# Patient Record
Sex: FEMALE | Employment: FULL TIME | URBAN - METROPOLITAN AREA
[De-identification: names, ages, dates, MRNs, and addresses within clinical notes are randomized per-mention and may not be internally consistent; named-entity substitution may affect disease eponyms.]

---

## 2017-08-18 LAB — MAMMOGRAPHY, EXTERNAL: NORMAL

## 2018-05-02 ENCOUNTER — OFFICE VISIT (OUTPATIENT)
Dept: FAMILY MEDICINE CLINIC | Age: 53
End: 2018-05-02

## 2018-05-02 VITALS
TEMPERATURE: 98.7 F | BODY MASS INDEX: 25.39 KG/M2 | SYSTOLIC BLOOD PRESSURE: 138 MMHG | HEIGHT: 66 IN | DIASTOLIC BLOOD PRESSURE: 83 MMHG | HEART RATE: 117 BPM | WEIGHT: 158 LBS | RESPIRATION RATE: 16 BRPM | OXYGEN SATURATION: 99 %

## 2018-05-02 DIAGNOSIS — M25.552 HIP PAIN, ACUTE, LEFT: Primary | ICD-10-CM

## 2018-05-02 RX ORDER — PREDNISONE 20 MG/1
TABLET ORAL
Refills: 0 | COMMUNITY
Start: 2018-04-23 | End: 2018-08-22 | Stop reason: ALTCHOICE

## 2018-05-02 RX ORDER — HYDROCODONE BITARTRATE AND ACETAMINOPHEN 5; 325 MG/1; MG/1
TABLET ORAL
Refills: 0 | COMMUNITY
Start: 2018-04-23 | End: 2018-08-22 | Stop reason: ALTCHOICE

## 2018-05-02 NOTE — LETTER
NOTIFICATION RETURN TO WORK / SCHOOL 
 
5/2/2018 3:56 PM 
 
Ms. Nadeem Calderon [facility] [address] To Whom It May Concern: 
 
Nadeem Calderon  was under the care of [facility] on 05/02/2018. If there are questions or concerns please have the patient contact our office [phone].  
 
 
 
Sincerely, 
 
 
Kathy Moreira MD

## 2018-05-02 NOTE — PROGRESS NOTES
Subjective:   History: This patient is a 48 y.o. female with a chief complaint of left leg pain/thigh pain    She reports onset of low back pain initially about 3 weeks ago which self-resolved and then started to have left leg pain /thigh pain in the anterior and lateral distribution about 1.5 weeks ago. She went to [facility], lumbar X-rays taken, given prednisone taper and hydrocodone. She reports she misread the directions on the steroid pack and actually took three predniosone pills daily for 3 days instead of the taper. She reports while she was on the steroids, the pain was relieved. She is taking 600mg ibuprofen daily without relief of symptoms. She has not had this issue prior. She denies any trauma or falls or acute injury. She exercises at the gym using a recumbent bike and elliptical.   No bowel or bladder incontinence. No fever, night sweats, or weight changes. No saddle anesthesia. Review of Systems:  General/Constitutional:  No fever, chills, sweats, fatigue, night sweats, weakness, weight loss or weight gain   Head: No headache, no trauma   Neck: No swelling, masses, stiffness, pain, or limited movement   Cardiac: No chest pain   Respiratory: No cough, shortness of breath, or dyspnea on exertion   GI: No incontinence. No nausea/vomiting, diarrhea, abdominal pain, bloody or dark stools  : No incontinence. No change in urinary habits. Peripheral Vascular: No edema, coldness, numbness, discoloration, pain, or paresthesias   Musculoskeletal: As per HPI  Neurological: No saddle distribution paresthesia. No siatic pain. No loss of consciousness, dizziness, seizures, dysarthria, cognitive changes, problems with balance, or unilateral weakness.     Past Medical History:   Diagnosis Date    Anemia 10/31/2011    Anemia NEC     Dr. Hipolito Mahoney     Family History   Problem Relation Age of Onset    Hypertension Mother     Hypertension Sister     Other Maternal Grandmother      anyeurism     Current Outpatient Prescriptions   Medication Sig Dispense Refill    HYDROcodone-acetaminophen (NORCO) 5-325 mg per tablet TAKE 1 TABLET BY MOUTH EVERY 4 TO 6 HOURS AS NEEDED FOR PAIN  0    predniSONE (DELTASONE) 20 mg tablet TAKE 3TABS DAILY X 3 DAYS, 2TABS DAILY X 3 DAYS, THEN 1 TAB DAILY X 3 DAYS  0    naproxen (NAPROSYN) 500 mg tablet Take 1 Tab by mouth two (2) times daily (with meals). Take with food or milk 30 Tab 0    calcium 500 mg Tab Take  by mouth.  ferrous sulfate (IRON) 325 mg (65 mg iron) tablet Take 650 mg by mouth Daily (before breakfast).  ascorbic acid (VITAMIN C) 250 mg tablet Take  by mouth. Allergies   Allergen Reactions    Lactose Diarrhea     Social History     Social History    Marital status:      Spouse name: N/A    Number of children: N/A    Years of education: N/A     Occupational History    Not on file. Social History Main Topics    Smoking status: Never Smoker    Smokeless tobacco: Never Used    Alcohol use No    Drug use: No    Sexual activity: Not on file     Other Topics Concern    Not on file     Social History Narrative       Objective:     Visit Vitals    /83    Pulse (!) 117    Temp 98.7 °F (37.1 °C)    Resp 16    Ht 5' 6\" (1.676 m)    Wt 158 lb (71.7 kg)    SpO2 99%    BMI 25.5 kg/m2       General: Alert and oriented and in no acute distress. Responds to all questions appropriately. LUNGS: Respirations unlabored. Skin: No obvious rash.   MSK:    Posture: Normal   Deformity: None    ROM:     Flexion: Normal    Extension: Normal     Lateral bending: Normal      Gait: Normal       Palpation:    L1-L5: No tenderness    Sacrum: No tenderness    Coccyx: No tenderness    Left Paraspinal: + tenderness    Right Paraspinal: No tenderness     Strength (0-5/5)    Hip Flexion:   Left: 5/5  Right: 5/5    Hip Extension:  Left: 5/5  Right: 5/5    Hip Abduction:  Left: 5/5  Right: 5/5    Hip Adduction:  Left: 5/5  Right: 5/5    Knee Extension: Left: 5/5  Right: 5/5    Knee Flexion:   Left: 5/5  Right: 5/5    Ankle dorsiflexion:  Left: 5/5  Right: 5/5    Ankle plantarflexion:  Left: 5/5  Right: 5/5    Great toe extension:  Left: 5/5  Right: 5/5     Sensation: Intact, no deficits   +allodynia in the lateral and anterior aspect of her thigh to light touch. Tender all along the GT bursa as well as the IT band, significant tenderness to light touch. No erythema or skin breakdown noted in left thigh. Bayron's test is negative, Pirformis stretch test is negative. Stressing LFCN on the left did not elicit symptoms. nontender piriformis or ischial tuberosity, nontender SI joint. SI joint tests are negative. Special test:    Straight leg: Left: Negative  Right: Negative    Kylahs: Left: Negative  Right: Negative    Piriformis: Left: Negative  Right: Negative    Imaging: Radiographs of the lumbar spine from [facility] personally reviewed and demonstrates mild degenerative changes of L5-S1. No obvious fracture or dislocation. Assessment:   Left hip pain. Uncertain etiology. Pain mostly localized over the lateral hip and thigh. Sx in the distribution of meralgia paraesthetica but unable to exacerbated sx on exam and no significant hx to suggest MP. Remainder of exam WNL strength, ROM, straight leg, etc.       Plan:   1. Home Exercise Program as per handout. She declined PT but will reconsider if   2. Ice 15 minutes, three times a day PRN and after exercise. Can alternate with heat for 15 minutes. 3. Discussed conservative management and possible injection to GT bursa , left side, if patient does not find relief with exercises and naproxen. Medications:    1. Naproxen (Aleve): 220mg 1-2 tablets twice a day PRN. 2. Acetaminophen (Tylenol):  500mg 1-2 tablets every 6 hours as needed for pain. .    RTC: If no better.

## 2018-05-02 NOTE — PROGRESS NOTES
Chief Complaint   Patient presents with    New Patient     49 y/o F. PResents with an Sciatic nerve in left leg X 1 wk.  Pain is consistant

## 2018-05-02 NOTE — MR AVS SNAPSHOT
[address] 
[phone] Patient: Zoila Hilario MRN: [id number] :1965 Visit Information Date & Time Provider Department Dept. Phone Encounter #  
 2018  3:00 PM Joana Bañuelos, [facility] [phone] [id number] Upcoming Health Maintenance Date Due DTaP/Tdap/Td series (1 - Tdap) 1988 PAP AKA CERVICAL CYTOLOGY 1988 FOBT Q 1 YEAR AGE 50-75 2017 Influenza Age 5 to Adult 2018 BREAST CANCER SCRN MAMMOGRAM 2019 Allergies as of 2018  Review Complete On: 10/12/2015 By: Tarsha Westfall MD  
  
 Severity Noted Reaction Type Reactions Lactose  2012    Diarrhea Current Immunizations  Never Reviewed No immunizations on file. Not reviewed this visit Vitals BP Pulse Temp Resp Height(growth percentile) Weight(growth percentile) 138/83 (!) 117 98.7 °F (37.1 °C) 16 5' 6\" (1.676 m) 158 lb (71.7 kg) SpO2 BMI OB Status Smoking Status 99% 25.5 kg/m2 Unknown Never Smoker Vitals History BMI and BSA Data Body Mass Index Body Surface Area 25.5 kg/m 2 1.83 m 2 Preferred Pharmacy Pharmacy Name Phone [id number] [place], [place] - [address] [address] [phone] Your Updated Medication List  
  
   
This list is accurate as of 18  4:05 PM.  Always use your most recent med list.  
  
  
  
  
 calcium 500 mg Tab Take  by mouth. HYDROcodone-acetaminophen 5-325 mg per tablet Commonly known as:  NORCO  
TAKE 1 TABLET BY MOUTH EVERY 4 TO 6 HOURS AS NEEDED FOR PAIN Iron 325 mg (65 mg iron) tablet Generic drug:  ferrous sulfate Take 650 mg by mouth Daily (before breakfast). naproxen 500 mg tablet Commonly known as:  NAPROSYN Take 1 Tab by mouth two (2) times daily (with meals). Take with food or milk predniSONE 20 mg tablet Commonly known as:  DELTASONE  
TAKE 3TABS DAILY X 3 DAYS, 2TABS DAILY X 3 DAYS, THEN 1 TAB DAILY X 3 DAYS  
  
 VITAMIN C 250 mg tablet Generic drug:  ascorbic acid (vitamin C) Take  by mouth. Introducing [facility] SERVICES! Dear Chaz Nguyen: Thank you for requesting a Vox[employer] account. Our records indicate that you already have an active Voxbone account. You can access your account anytime at [email or web address] Did you know that you can access your hospital and ER discharge instructions at any time in Voxbone? You can also review all of your test results from your hospital stay or ER visit. Additional Information If you have questions, please visit the Frequently Asked Questions section of the Voxbone website at [email or web address] Remember, Voxbone is NOT to be used for urgent needs. For medical emergencies, dial 911. Now available from your iPhone and Android! Please provide this summary of care documentation to your next provider. Your primary care clinician is listed as Melchor Samantha. If you have any questions after today's visit, please call [phone].

## 2018-05-23 ENCOUNTER — OFFICE VISIT (OUTPATIENT)
Dept: FAMILY MEDICINE CLINIC | Age: 53
End: 2018-05-23

## 2018-05-23 VITALS
HEART RATE: 74 BPM | TEMPERATURE: 97.4 F | HEIGHT: 66 IN | SYSTOLIC BLOOD PRESSURE: 133 MMHG | BODY MASS INDEX: 26.16 KG/M2 | DIASTOLIC BLOOD PRESSURE: 87 MMHG | WEIGHT: 162.8 LBS | RESPIRATION RATE: 16 BRPM | OXYGEN SATURATION: 100 %

## 2018-05-23 DIAGNOSIS — H81.11 BPPV (BENIGN PAROXYSMAL POSITIONAL VERTIGO), RIGHT: Primary | ICD-10-CM

## 2018-05-23 NOTE — PATIENT INSTRUCTIONS
Benign Paroxysmal Positional Vertigo (BPPV): Care Instructions  Your Care Instructions    Benign paroxysmal positional vertigo, also called BPPV, is an inner ear problem. It causes a spinning or whirling sensation when you move your head. This sensation is called vertigo. The vertigo usually lasts for less than a minute. People often have vertigo spells for a few days or weeks. Then the vertigo goes away. But it may come back again. The vertigo may be mild, or it may be bad enough to cause unsteadiness, nausea, and vomiting. When you move, your inner ear sends messages to the brain. This helps you keep your balance. Vertigo can happen when debris builds up in the inner ear. The buildup can cause the inner ear to send the wrong message to the brain. Your doctor may move you in different positions to help your vertigo get better faster. This is called the Epley maneuver. Your doctor may also prescribe medicines or exercises to help with your symptoms. Follow-up care is a key part of your treatment and safety. Be sure to make and go to all appointments, and call your doctor if you are having problems. It's also a good idea to know your test results and keep a list of the medicines you take. How can you care for yourself at home? · If your doctor suggests that you do Galvin-Daroff exercises:  ¨ Sit on the edge of a bed or sofa. Quickly lie down on the side that causes the worst vertigo. Lie on your side with your ear down. ¨ Stay in this position for at least 30 seconds or until the vertigo goes away. ¨ Sit up. If this causes vertigo, wait for it to stop. ¨ Repeat the procedure on the other side. ¨ Repeat this 10 times. Do these exercises 2 times a day until the vertigo is gone. When should you call for help? Call 911 anytime you think you may need emergency care. For example, call if:  ? · You have symptoms of a stroke.  These may include:  ¨ Sudden numbness, tingling, weakness, or loss of movement in your face, arm, or leg, especially on only one side of your body. ¨ Sudden vision changes. ¨ Sudden trouble speaking. ¨ Sudden confusion or trouble understanding simple statements. ¨ Sudden problems with walking or balance. ¨ A sudden, severe headache that is different from past headaches. ?Call your doctor now or seek immediate medical care if:  ? · You have new or worse nausea and vomiting. ? · You have new symptoms such as hearing loss or roaring in your ears. ? Watch closely for changes in your health, and be sure to contact your doctor if:  ? · You are not getting better as expected. ? · Your vertigo gets worse. Where can you learn more? Go to http://tico-edin.info/. Enter  in the search box to learn more about \"Benign Paroxysmal Positional Vertigo (BPPV): Care Instructions. \"  Current as of: May 12, 2017  Content Version: 11.4  © 6160-1622 [employer]. Care instructions adapted under license by [employer] (which disclaims liability or warranty for this information). If you have questions about a medical condition or this instruction, always ask your healthcare professional. [place] [address] any warranty or liability for your use of this information.

## 2018-05-23 NOTE — PROGRESS NOTES
Chief Complaint   Patient presents with    Dizziness     Follow-up for vertigo per [facility]. Dizziness starting Monday. Patient says she feels dizzy off and on. Still has moments. Pt unsure what sets off the dizziness. 1. Have you been to the ER, urgent care clinic since your last visit? Hospitalized since your last visit? Yes, [facility]    2. Have you seen or consulted any other health care providers outside of the [address] [place] [address] [place] since your last visit? Include any pap smears or colon screening.   No

## 2018-05-23 NOTE — MR AVS SNAPSHOT
[address] 
[phone] Patient: Rajiv Robb MRN: [id number] :1965 Visit Information Date & Time Provider Department Dept. Phone Encounter #  
 2018  3:30 PM Fausto Waldrop MD [address] [place] [address] [phone] [id number] Upcoming Health Maintenance Date Due DTaP/Tdap/Td series (1 - Tdap) 1988 PAP AKA CERVICAL CYTOLOGY 1988 FOBT Q 1 YEAR AGE 50-75 2017 Influenza Age 5 to Adult 2018 BREAST CANCER SCRN MAMMOGRAM 2019 Allergies as of 2018  Review Complete On: 2018 By: Makeda Han Severity Noted Reaction Type Reactions Lactose  2012    Diarrhea Current Immunizations  Never Reviewed No immunizations on file. Not reviewed this visit You Were Diagnosed With   
  
 Codes Comments BPPV (benign paroxysmal positional vertigo), right    -  Primary ICD-10-CM: H81.11 
ICD-9-CM: 386.11 Vitals BP Pulse Temp Resp Height(growth percentile) Weight(growth percentile) 133/87 (BP 1 Location: Right arm, BP Patient Position: Sitting) 74 97.4 °F (36.3 °C) (Oral) 16 5' 6\" (1.676 m) 162 lb 12.8 oz (73.8 kg) LMP SpO2 BMI OB Status Smoking Status (LMP Unknown) 100% 26.28 kg/m2 Unknown Never Smoker Vitals History BMI and BSA Data Body Mass Index Body Surface Area  
 26.28 kg/m 2 1.85 m 2 Preferred Pharmacy Pharmacy Name Phone [id number] [address] [address] [phone] Your Updated Medication List  
  
   
This list is accurate as of 18  4:25 PM.  Always use your most recent med list.  
  
  
  
  
 calcium 500 mg Tab Take  by mouth. HYDROcodone-acetaminophen 5-325 mg per tablet Commonly known as:  NORCO  
TAKE 1 TABLET BY MOUTH EVERY 4 TO 6 HOURS AS NEEDED FOR PAIN Iron 325 mg (65 mg iron) tablet Generic drug:  ferrous sulfate Take 650 mg by mouth Daily (before breakfast). naproxen 500 mg tablet Commonly known as:  NAPROSYN Take 1 Tab by mouth two (2) times daily (with meals). Take with food or milk  
  
 predniSONE 20 mg tablet Commonly known as:  DELTASONE  
TAKE 3TABS DAILY X 3 DAYS, 2TABS DAILY X 3 DAYS, THEN 1 TAB DAILY X 3 DAYS  
  
 VITAMIN C 250 mg tablet Generic drug:  ascorbic acid (vitamin C) Take  by mouth. Patient Instructions Benign Paroxysmal Positional Vertigo (BPPV): Care Instructions Your Care Instructions Benign paroxysmal positional vertigo, also called BPPV, is an inner ear problem. It causes a spinning or whirling sensation when you move your head. This sensation is called vertigo. The vertigo usually lasts for less than a minute. People often have vertigo spells for a few days or weeks. Then the vertigo goes away. But it may come back again. The vertigo may be mild, or it may be bad enough to cause unsteadiness, nausea, and vomiting. When you move, your inner ear sends messages to the brain. This helps you keep your balance. Vertigo can happen when debris builds up in the inner ear. The buildup can cause the inner ear to send the wrong message to the brain. Your doctor may move you in different positions to help your vertigo get better faster. This is called the Epley maneuver. Your doctor may also prescribe medicines or exercises to help with your symptoms. Follow-up care is a key part of your treatment and safety. Be sure to make and go to all appointments, and call your doctor if you are having problems. It's also a good idea to know your test results and keep a list of the medicines you take. How can you care for yourself at home? · If your doctor suggests that you do Galvin-Daroff exercises: ¨ Sit on the edge of a bed or sofa. Quickly lie down on the side that causes the worst vertigo. Lie on your side with your ear down. ¨ Stay in this position for at least 30 seconds or until the vertigo goes away. ¨ Sit up. If this causes vertigo, wait for it to stop. ¨ Repeat the procedure on the other side. ¨ Repeat this 10 times. Do these exercises 2 times a day until the vertigo is gone. When should you call for help? Call 911 anytime you think you may need emergency care. For example, call if: 
? · You have symptoms of a stroke. These may include: 
¨ Sudden numbness, tingling, weakness, or loss of movement in your face, arm, or leg, especially on only one side of your body. ¨ Sudden vision changes. ¨ Sudden trouble speaking. ¨ Sudden confusion or trouble understanding simple statements. ¨ Sudden problems with walking or balance. ¨ A sudden, severe headache that is different from past headaches. ?Call your doctor now or seek immediate medical care if: 
? · You have new or worse nausea and vomiting. ? · You have new symptoms such as hearing loss or roaring in your ears. ? Watch closely for changes in your health, and be sure to contact your doctor if: 
? · You are not getting better as expected. ? · Your vertigo gets worse. Where can you learn more? Go to http://tico-edin.info/. Enter  in the search box to learn more about \"Benign Paroxysmal Positional Vertigo (BPPV): Care Instructions. \" Current as of: May 12, 2017 Content Version: 11.4 © 5022-0781 [employer]. Care instructions adapted under license by [employer] (which disclaims liability or warranty for this information). If you have questions about a medical condition or this instruction, always ask your healthcare professional. [address] any warranty or liability for your use of this information. Introducing [facility] & HEALTH SERVICES! Dear Grady Zapien: Thank you for requesting a [employer] account. Our records indicate that you already have an active [employer] account.   You can access your account anytime at [email or web address] Did you know that you can access your hospital and ER discharge instructions at any time in DiJiPOP? You can also review all of your test results from your hospital stay or ER visit. Additional Information If you have questions, please visit the Frequently Asked Questions section of the DiJiP[employer] website at [email or web address] Remember, DiJiPOP is NOT to be used for urgent needs. For medical emergencies, dial 911. Now available from your iPhone and Android! Please provide this summary of care documentation to your next provider. Your primary care clinician is listed as Maricel Neely. If you have any questions after today's visit, please call [phone].

## 2018-05-23 NOTE — PROGRESS NOTES
Samm Odonnell is a 48 y.o. female who presents   Vertigo  - started 9 days ago. Felt room spinning around of work day. Felt lightheaded, but no LOC. Call 911, evaluated by EMS and brought to [facility].  Worked up for cerebellar stroke. Underwent CT and MRI which was normal.  Discharged as BPPV. - occurred again when at [employer], went away on its own. - no numbness/tingling/weakness over extremities. No facial droop, dysphagia, aphasia. ROS: (positive in bold)  General: wt loss, fever, fatigue or appetite change   Skin: rashes or suspicious skin lesions  HEENT: changes in vision, smell, taste, hearing, earache, tinnitus, hoarseness, dysphagia, congestion, sore throat  Cardiac: chest pain, palpitations, MERCEDES, orthopnea, PND, edema   Pul: SOB, dyspnea, wheezing, cough, hemoptysis  GI: abdominal pain, N&V, diarrhea, constipation, hematemsis, melena,   : hematuria, dysuria, freq, urgency, incontinence   MS: joint pain, swelling, stiffness, myalgia, back pain  Neuro: weakness, parasthesias, headache, syncope, seizure  Psych: anxiety, depression    Past Medical History:  Past Medical History:   Diagnosis Date    Anemia 10/31/2011    Anemia NEC     Dr. Donya Johnson       Past Surgical History:  Past Surgical History:   Procedure Laterality Date    ENDOSCOPY, COLON, DIAGNOSTIC  10/07    HX  SECTION      HX TONSILLECTOMY         Family History:  Family History   Problem Relation Age of Onset    Hypertension Mother     Hypertension Sister     Other Maternal Grandmother      anyeurism       Allergies:   Allergies   Allergen Reactions    Lactose Diarrhea       Social History:  Social History   Substance Use Topics    Smoking status: Never Smoker    Smokeless tobacco: Never Used    Alcohol use No       Current Meds:  Current Outpatient Prescriptions on File Prior to Visit   Medication Sig Dispense Refill    HYDROcodone-acetaminophen (NORCO) 5-325 mg per tablet TAKE 1 TABLET BY MOUTH EVERY 4 TO 6 HOURS AS NEEDED FOR PAIN  0    predniSONE (DELTASONE) 20 mg tablet TAKE 3TABS DAILY X 3 DAYS, 2TABS DAILY X 3 DAYS, THEN 1 TAB DAILY X 3 DAYS  0    naproxen (NAPROSYN) 500 mg tablet Take 1 Tab by mouth two (2) times daily (with meals). Take with food or milk 30 Tab 0    calcium 500 mg Tab Take  by mouth.  ferrous sulfate (IRON) 325 mg (65 mg iron) tablet Take 650 mg by mouth Daily (before breakfast).  ascorbic acid (VITAMIN C) 250 mg tablet Take  by mouth. No current facility-administered medications on file prior to visit. Visit Vitals    /87 (BP 1 Location: Right arm, BP Patient Position: Sitting)    Pulse 74    Temp 97.4 °F (36.3 °C) (Oral)    Resp 16    Ht 5' 6\" (1.676 m)    Wt 162 lb 12.8 oz (73.8 kg)    LMP  (LMP Unknown)    SpO2 100%    BMI 26.28 kg/m2       Gen: Well developed, well nourished female in no acute distress  HEENT: normocephalic/atraumatic; PERRL; TM intact, translucent, and neutral BL;  oropharynx shows no erythema or exudates. Positive Richy Hallpike in R  Neck:   Supple, no lympadenopathy, no thyromegaly  Card:  RRR, no m/r/g  Chest:  CTAB, no w/r/r  Neuro: CN II-XII grossly intact, reflexes 2+ BL      A/P:      ICD-10-CM ICD-9-CM    1. BPPV (benign paroxysmal positional vertigo), right H81.11 386.11 REFERRAL TO PHYSICAL THERAPY      - improvement in symptoms with Epley maneuver. - advised to look up move on Youtube for recurrent symptoms. - referral for vertigo clinic if no improvement or worsening of symptoms.

## 2018-08-21 LAB — MAMMOGRAPHY, EXTERNAL: NORMAL

## 2018-08-22 ENCOUNTER — TELEPHONE (OUTPATIENT)
Dept: FAMILY MEDICINE CLINIC | Age: 53
End: 2018-08-22

## 2018-08-22 ENCOUNTER — OFFICE VISIT (OUTPATIENT)
Dept: FAMILY MEDICINE CLINIC | Age: 53
End: 2018-08-22

## 2018-08-22 VITALS
SYSTOLIC BLOOD PRESSURE: 125 MMHG | DIASTOLIC BLOOD PRESSURE: 89 MMHG | RESPIRATION RATE: 18 BRPM | TEMPERATURE: 98.2 F | HEIGHT: 66 IN | HEART RATE: 77 BPM | BODY MASS INDEX: 25.97 KG/M2 | OXYGEN SATURATION: 99 % | WEIGHT: 161.6 LBS

## 2018-08-22 DIAGNOSIS — Z86.79 H/O AORTIC VALVE DISEASE: ICD-10-CM

## 2018-08-22 DIAGNOSIS — Z82.49 FAMILY HISTORY OF CARDIAC DISORDER: ICD-10-CM

## 2018-08-22 DIAGNOSIS — Z23 ENCOUNTER FOR IMMUNIZATION: ICD-10-CM

## 2018-08-22 DIAGNOSIS — Z23 NEED FOR SHINGLES VACCINE: ICD-10-CM

## 2018-08-22 DIAGNOSIS — Z00.00 WELL WOMAN EXAM (NO GYNECOLOGICAL EXAM): Primary | ICD-10-CM

## 2018-08-22 DIAGNOSIS — Z13.1 SCREENING FOR DIABETES MELLITUS (DM): ICD-10-CM

## 2018-08-22 DIAGNOSIS — Z13.6 SCREENING FOR CARDIOVASCULAR CONDITION: ICD-10-CM

## 2018-08-22 DIAGNOSIS — Z12.11 SCREEN FOR COLON CANCER: ICD-10-CM

## 2018-08-22 DIAGNOSIS — Z11.3 ROUTINE SCREENING FOR STI (SEXUALLY TRANSMITTED INFECTION): ICD-10-CM

## 2018-08-22 RX ORDER — MECLIZINE HYDROCHLORIDE 25 MG/1
TABLET ORAL
Refills: 0 | COMMUNITY
Start: 2018-05-16 | End: 2019-08-23

## 2018-08-22 RX ORDER — BISMUTH SUBSALICYLATE 262 MG
1 TABLET,CHEWABLE ORAL DAILY
COMMUNITY

## 2018-08-22 NOTE — LETTER
To whom it may concern: 
 
I have examined Cheri Braun, 1965, she is a patient under my care. I have found her to be low risk for TB and do not recommend further testing based on our screening. 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 Please contact my office with questions Sincerely, 
 
 
 
 
Daisy Lan MD 
[address] 08/22/18 10:50 AM

## 2018-08-22 NOTE — TELEPHONE ENCOUNTER
----- Message from Morena Mcintosh MD sent at 8/22/2018 10:24 AM EDT -----  Regarding: records req  Request mammo and pap results from Gynecology at [facility]  Dr Ha Plane    Thanks!

## 2018-08-22 NOTE — LETTER
8/22/2018 10:56 AM 
 
Ms. [place] [id number] To whom it may concern: 
 
I have examined Cheri Braun, 1965, she is a patient under my care. I have found her to be low risk for TB and do not recommend further testing based on our screening. 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 Please contact my office with questions Sincerely, 
 
 
Chaim Aj MD

## 2018-08-22 NOTE — MR AVS SNAPSHOT
[address] 
[phone] Patient: Saul Silva MRN: [id number] :1965 Visit Information Date & Time Provider Department Dept. Phone Encounter #  
 2018 10:00 AM Luis Abernathy 34 [id number] Upcoming Health Maintenance Date Due DTaP/Tdap/Td series (1 - Tdap) 1988 PAP AKA CERVICAL CYTOLOGY 1988 FOBT Q 1 YEAR AGE 50-75 2017 Influenza Age 5 to Adult 2018 BREAST CANCER SCRN MAMMOGRAM 2019 Allergies as of 2018  Review Complete On: 2018 By: Taya Garza LPN Severity Noted Reaction Type Reactions Lactose  2012    Diarrhea Current Immunizations  Never Reviewed No immunizations on file. Not reviewed this visit You Were Diagnosed With   
  
 Codes Comments Well woman exam (no gynecological exam)    -  Primary ICD-10-CM: Z00.00 ICD-9-CM: V70.0 [V70.0] Need for shingles vaccine     ICD-10-CM: Q06 ICD-9-CM: V04.89 Screen for colon cancer     ICD-10-CM: Z12.11 ICD-9-CM: V76.51 Encounter for immunization     ICD-10-CM: J67 ICD-9-CM: V03.89 Family history of cardiac disorder     ICD-10-CM: Z82.49 
ICD-9-CM: V17.49 Routine screening for STI (sexually transmitted infection)     ICD-10-CM: Z11.3 ICD-9-CM: V74.5 H/O aortic valve disease     ICD-10-CM: Z86.79 
ICD-9-CM: V12.59 Screening for cardiovascular condition     ICD-10-CM: Z13.6 ICD-9-CM: V81.2 Screening for diabetes mellitus (DM)     ICD-10-CM: Z13.1 ICD-9-CM: V77.1 Vitals BP Pulse Temp Resp Height(growth percentile) Weight(growth percentile) 125/89 (BP 1 Location: Left arm, BP Patient Position: Sitting) 77 98.2 °F (36.8 °C) (Oral) 18 5' 6\" (1.676 m) 161 lb 9.6 oz (73.3 kg) SpO2 BMI OB Status Smoking Status 99% 26.08 kg/m2 Unknown Never Smoker Vitals History BMI and BSA Data Body Mass Index Body Surface Area 26.08 kg/m 2 1.85 m 2 Preferred Pharmacy Pharmacy Name Phone [facility][id number] - [place] - [address] [phone] Your Updated Medication List  
  
   
This list is accurate as of 18 10:51 AM.  Always use your most recent med list.  
  
  
  
  
 meclizine 25 mg tablet Commonly known as:  ANTIVERT  
TAKE 1 TABLET BY MOUTH 3 TIMES A DAY AS NEEDED FOR DIZZINESS  
  
 multivitamin tablet Commonly known as:  ONE A DAY Take 1 Tab by mouth daily. varicella-zoster recombinant (PF) 50 mcg/0.5 mL Susr injection Commonly known as:  SHINGRIX  
0.5 mL by IntraMUSCular route once for 1 dose. Prescriptions Printed Refills  
 varicella-zoster recombinant, PF, (SHINGRIX) 50 mcg/0.5 mL susr injection 1 Si.5 mL by IntraMUSCular route once for 1 dose. Class: Print Route: IntraMUSCular We Performed the Following CHLAMYDIA/GC PCR [72533 CPT(R)] HIV 1/2 AG/AB, 4TH GENERATION,W RFLX CONFIRM P1631018 CPT(R)] HM COLONOSCOPY [HM4 Custom] LIPID PANEL [98625 CPT(R)] METABOLIC PANEL, COMPREHENSIVE [81531 CPT(R)] REFERRAL TO CARDIOLOGY [UBH84 Custom] REFERRAL TO GASTROENTEROLOGY [QWP83 Custom] RPR W/REFLEX TITER AND CONFIRMATION [[id number] Custom] Referral Information Referral ID Referred By Referred To  
  
 [id number] VALERIE, [address]   
   [address] [place] [address] [address]  [address] Visits Status Start Date End Date 1 New Request 18 If your referral has a status of pending review or denied, additional information will be sent to support the outcome of this decision. Referral ID Referred By Referred To  
 [id number] Cat PADILAL MD  
   [address] [place] [address] [address] [place], [address] [place] [address] Phone: [phone] Fax: [phone] Visits Status Start Date End Date 1 New Request 8/22/18 8/22/19 If your referral has a status of pending review or denied, additional information will be sent to support the outcome of this decision. Introducing \[facility] [place] [facility]\"" & HEALTH SERVICES! Dear Sammy Current: Thank you for requesting a [employer] account. Our records indicate that you already have an active [employer] account. You can access your account anytime at [email or web address] Did you know that you can access your hospital and ER discharge instructions at any time in [employer]? You can also review all of your test results from your hospital stay or ER visit. Additional Information If you have questions, please visit the Frequently Asked Questions section of the [employer] website at [email or web address] Remember, [employer] is NOT to be used for urgent needs. For medical emergencies, dial 911. Now available from your iPhone and Android! Please provide this summary of care documentation to your next provider. Your primary care clinician is listed as Gema Ferrer. If you have any questions after today's visit, please call [phone].

## 2018-08-22 NOTE — PROGRESS NOTES
Subjective:   48 y.o. female for Well Woman Check. Her gyne and breast care is done elsewhere by her Ob-Gyne physician. 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 She and her  have 4 children. No etoh, no tobacco, no illicit drugs. She is willing for STD testing, sexually active and monogamous with one male partner--her  previously has had an affair. She is UTD on pap and mammo. Knows she is due for colonoscopy  Recently found out that both of her cousins (1st) have congenital irregularity of aortic valve (suspected genetic component), both have recently required surgery. She was urged by her family members to have an evaluation with cardiology and she would like to pursue this for reassurance. ROS: Feeling generally well. No TIA's or unusual headaches, no dysphagia. No prolonged cough. No dyspnea or chest pain on exertion. No abdominal pain, change in bowel habits, black or bloody stools. No urinary tract symptoms. No new or unusual musculoskeletal symptoms. A 12 point review of systems was negative except as noted here or in the HPI. Objective: The patient appears well, alert, oriented x 3, in no distress. Visit Vitals    /89 (BP 1 Location: Left arm, BP Patient Position: Sitting)    Pulse 77    Temp 98.2 °F (36.8 °C) (Oral)    Resp 18    Ht 5' 6\" (1.676 m)    Wt 161 lb 9.6 oz (73.3 kg)    SpO2 99%    BMI 26.08 kg/m2     ENT normal.  Neck supple. No adenopathy or thyromegaly. EOMI, PEFRLA, wearing glasses, anicteric, no conjunctival injection. Lungs are clear, good air entry, no wheezes, rhonchi or rales. S1 and S2 normal, no murmurs, regular rate and rhythm. Abdomen soft without tenderness, guarding, mass or organomegaly. Extremities show no edema, normal peripheral pulses. Neurological is normal, no focal findings.   Breast and Pelvic exams are deferred. Assessment/Plan:   Well Woman  continue present diet with no restrictions, continue present plan, routine labs ordered, call if any problems    ICD-10-CM ICD-9-CM    1. Well woman exam (no gynecological exam) Z00.00 V70.0     [V70.0]   2. Need for shingles vaccine Z23 V04.89 varicella-zoster recombinant, PF, (SHINGRIX) 50 mcg/0.5 mL susr injection   3. Screen for colon cancer Z12.11 V76.51 REFERRAL TO GASTROENTEROLOGY       COLONOSCOPY   4. Encounter for immunization Z23 V03.89 varicella-zoster recombinant, PF, (SHINGRIX) 50 mcg/0.5 mL susr injection   5. Family history of cardiac disorder Z82.49 V17.49 LIPID PANEL      REFERRAL TO CARDIOLOGY   6. Routine screening for STI (sexually transmitted infection) Z11.3 V74.5 CHLAMYDIA/GC PCR      HIV 1/2 AG/AB, 4TH GENERATION,W RFLX CONFIRM      RPR W/REFLEX TITER AND CONFIRMATION   7. H/O aortic valve disease Z86.79 V12.59    8.  Screening for cardiovascular condition Z13.6 V81.2 LIPID PANEL   9. Screening for diabetes mellitus (DM) J06.9 S38.0 METABOLIC PANEL, COMPREHENSIVE   Desire for STI testing: Labs as ordered  Screening for DM, Lipid disorders, Colon Ca: as per orders  Family hx of cardiac/aortic valve condition, suspected genetic component: referral to cardiology for determination of risk and level of evaluation  Recommendation for vaccinations: shingles and flu shots recommended this year, may be obtained at pharmacy  Continue to follow with Gynecology for mammo and paps, will request these records for completeness of chart    F/u annually or as needed  Orders Placed This Encounter    CHLAMYDIA/GC PCR    LIPID PANEL    METABOLIC PANEL, COMPREHENSIVE    HIV 1/2 AG/AB, 4TH GENERATION,W RFLX CONFIRM    RPR W/REFLEX TITER AND CONFIRMATION    REFERRAL TO GASTROENTEROLOGY    [facility]    meclizine (ANTIVERT) 25 mg tablet    multivitamin (ONE A DAY) tablet    varicella-zoster recombinant, PF, (SHINGRIX) 50 mcg/0.5 mL susr injection

## 2018-08-22 NOTE — PROGRESS NOTES
Chief Complaint   Patient presents with    Complete Physical     1. Have you been to the ER, urgent care clinic since your last visit? Hospitalized since your last visit? Yes, 05/13/2018 [facility] ER for Vertigo. 2. Have you seen or consulted any other health care providers outside of the [address] [place] [address] since your last visit? Include any pap smears or colon screening.  No

## 2018-09-01 LAB
ALBUMIN SERPL-MCNC: 4.6 G/DL (ref 3.5–5.5)
ALBUMIN/GLOB SERPL: 1.6 {RATIO} (ref 1.2–2.2)
ALP SERPL-CCNC: NORMAL IU/L
ALT SERPL-CCNC: 23 IU/L (ref 0–32)
AST SERPL-CCNC: 26 IU/L (ref 0–40)
BILIRUB SERPL-MCNC: 0.6 MG/DL (ref 0–1.2)
BUN SERPL-MCNC: 12 MG/DL (ref 6–24)
BUN/CREAT SERPL: 14 (ref 9–23)
C TRACH RRNA SPEC QL NAA+PROBE: NEGATIVE
CALCIUM SERPL-MCNC: NORMAL MG/DL
CHLORIDE SERPL-SCNC: NORMAL MMOL/L
CHOLEST SERPL-MCNC: 166 MG/DL (ref 100–199)
CO2 SERPL-SCNC: NORMAL MMOL/L
CREAT SERPL-MCNC: 0.87 MG/DL (ref 0.57–1)
GLOBULIN SER CALC-MCNC: 2.9 G/DL (ref 1.5–4.5)
GLUCOSE SERPL-MCNC: 76 MG/DL (ref 65–99)
HDLC SERPL-MCNC: 68 MG/DL
HIV 1+2 AB+HIV1 P24 AG SERPL QL IA: NON REACTIVE
INTERPRETATION, 910389: NORMAL
LDLC SERPL CALC-MCNC: 90 MG/DL (ref 0–99)
N GONORRHOEA RRNA SPEC QL NAA+PROBE: NEGATIVE
POTASSIUM SERPL-SCNC: NORMAL MMOL/L
PROT SERPL-MCNC: 7.5 G/DL (ref 6–8.5)
RPR SER QL: NON REACTIVE
SODIUM SERPL-SCNC: NORMAL MMOL/L
TRIGL SERPL-MCNC: 41 MG/DL (ref 0–149)
VLDLC SERPL CALC-MCNC: 8 MG/DL (ref 5–40)

## 2018-11-07 ENCOUNTER — TELEPHONE (OUTPATIENT)
Dept: FAMILY MEDICINE CLINIC | Age: 53
End: 2018-11-07

## 2018-11-07 NOTE — TELEPHONE ENCOUNTER
----- Message from Deborah Hernandez sent at 11/7/2018 10:22 AM EST -----  Regarding: Dr Allison/telephone  Pt is calling to get Test Result, from her 8- appt, please call pt at [phone].

## 2019-07-19 ENCOUNTER — OFFICE VISIT (OUTPATIENT)
Dept: FAMILY MEDICINE CLINIC | Age: 54
End: 2019-07-19

## 2019-07-19 VITALS
SYSTOLIC BLOOD PRESSURE: 138 MMHG | HEART RATE: 71 BPM | HEIGHT: 66 IN | DIASTOLIC BLOOD PRESSURE: 94 MMHG | RESPIRATION RATE: 18 BRPM | BODY MASS INDEX: 25.88 KG/M2 | TEMPERATURE: 98.2 F | WEIGHT: 161 LBS

## 2019-07-19 DIAGNOSIS — I51.7 LVH (LEFT VENTRICULAR HYPERTROPHY): ICD-10-CM

## 2019-07-19 DIAGNOSIS — R94.31 ABNORMAL EKG: ICD-10-CM

## 2019-07-19 DIAGNOSIS — R03.0 ELEVATED BLOOD PRESSURE READING: Primary | ICD-10-CM

## 2019-07-19 DIAGNOSIS — R07.89 CHEST DISCOMFORT: ICD-10-CM

## 2019-07-19 RX ORDER — LISINOPRIL 5 MG/1
5 TABLET ORAL DAILY
Qty: 30 TAB | Refills: 1 | Status: SHIPPED | OUTPATIENT
Start: 2019-07-19 | End: 2019-08-23 | Stop reason: SDUPTHER

## 2019-07-19 NOTE — PROGRESS NOTES
Chief Complaint   Patient presents with    Elevated Blood Pressure     \"chest discomfort\"      1. Have you been to the ER, urgent care clinic since your last visit? Hospitalized since your last visit? No     2. Have you seen or consulted any other health care providers outside of the [address] [place] [address] since your last visit? Include any pap smears or colon screening.  No

## 2019-07-19 NOTE — PROGRESS NOTES
Family Medicine Acute Visit Progress Note  Patient: Christa Machado  1965, 48 y.o., female  Encounter Date: 2019    ASSESSMENT & PLAN    ICD-10-CM ICD-9-CM    1. Elevated blood pressure reading U42.9 249.6 METABOLIC PANEL, BASIC   2. LVH (left ventricular hypertrophy) I51.7 429.3 ECHO ADULT COMPLETE   3. Abnormal EKG R94.31 794.31 ECHO ADULT COMPLETE      METABOLIC PANEL, BASIC   4. Chest discomfort R07.89 786.59 AMB POC EKG ROUTINE W/ 12 LEADS, INTER & REP       Orders Placed This Encounter    METABOLIC PANEL, BASIC    AMB POC EKG ROUTINE W/ 12 LEADS, INTER & REP     Order Specific Question:   Reason for Exam:     Answer:   chest discomfort    lisinopril (PRINIVIL, ZESTRIL) 5 mg tablet     Sig: Take 1 Tab by mouth daily. Dispense:  30 Tab     Refill:  1       Patient Instructions   EKG meets criteria for LVH, given this and the combination of elevated blood pressure readings at home and here in the office I recommend that we start treatment for hypertension. We will give the patient lisinopril 5 mg, we may have to titrate this up, I did discuss this with her in the office  She will go for an echocardiogram as discussed and if she has any worsening or changing symptoms she will be seen urgently at our urgent care or emergency department, otherwise I will follow her up in 2 weeks for blood pressure recheck in evaluation of medication stability      CHIEF COMPLAINT  Chief Complaint   Patient presents with    Elevated Blood Pressure     \"chest discomfort\"; headaches sometimes, faintness since   Christa Machado is a 48 y.o. female presenting today for elevated blood pressures at home associated with ongoing chest discomfort, she cannot describe the pain other than that is uncomfortable, it is not pain, it is not pressure-like, it is not fluttering, it is not palpable externally.   She has a family history of cardiovascular disease, 1 of her cousins just  from a heart problem at the age of 36 or so, her both of her parents have high blood pressure, her grandparents have had cardiac history as well. She is very concerned about this  She is also concerned about her hot flashes, she is wondering if there is anything that could treat both her chest discomfort and hot flashes  She does not want to take medications, she is quite worried however. She reports that she is managing stress at home quite well per her report, in the past she did try medication once about 10 years ago but she reports that it did not make her feel well, she thinks she took Zoloft and she stopped it soon after starting it because she said that it gave her a head rush  She has no shortness of breath although she reports that she feels that her breathing is shallow  She also reports that she has no new leg swelling, no headaches or vision changes, no paroxysmal nocturnal dyspnea no change in exercise tolerance  Her blood pressures at home have been running high 130s to low 140s over high 80s to low 90s. She has been checking consistently  She has never been diagnosed with high blood pressure. Review of Systems  A 12 point review of systems was negative except as noted here or in the HPI. OBJECTIVE  Visit Vitals  BP (!) 138/94   Pulse 71   Temp 98.2 °F (36.8 °C) (Oral)   Resp 18   Ht 5' 6\" (1.676 m)   Wt 161 lb (73 kg)   BMI 25.99 kg/m²       Physical Exam   Constitutional: She is oriented to person, place, and time. She appears well-developed and well-nourished. No distress. NAD, Nontoxic, Appears Stated Age   HENT:   Head: Normocephalic and atraumatic. Mouth/Throat: Oropharynx is clear and moist.   Eyes: Conjunctivae and EOM are normal. Right eye exhibits no discharge. Left eye exhibits no discharge. No scleral icterus. Neck: Neck supple. No JVD present. No thyromegaly present. Cardiovascular: Normal rate, regular rhythm, normal heart sounds and intact distal pulses.  Exam reveals no gallop and no friction rub. No murmur heard. Pulmonary/Chest: Effort normal and breath sounds normal. No stridor. No respiratory distress. She has no wheezes. She has no rales. She exhibits no tenderness. Abdominal: Soft. Bowel sounds are normal. She exhibits no distension. There is no tenderness. There is no rebound and no guarding. Musculoskeletal: She exhibits no edema or tenderness. Lymphadenopathy:     She has no cervical adenopathy. Neurological: She is alert and oriented to person, place, and time. Grossly intact CN   Skin: Skin is warm and dry. No rash noted. She is not diaphoretic. Psychiatric: She has a normal mood and affect. Her behavior is normal.   Anxious   Nursing note and vitals reviewed. No results found for any visits on 07/19/19. HISTORICAL  PMH, PSH, FHX, SOCHX, ALLERGIES and MES were reviewed and updated today. Otto Land MD  [facility]  07/19/19 11:17 AM    Portions of this note may have been populated using smart dictation software and may have \"sounds-like\" errors present. Pt was counseled on risks, benefits and alternatives of treatment options. All questions were asked and answered and the patient was agreeable with the treatment plan as outlined.

## 2019-07-19 NOTE — PATIENT INSTRUCTIONS
EKG meets criteria for LVH, given this and the combination of elevated blood pressure readings at home and here in the office I recommend that we start treatment for hypertension.   We will give the patient lisinopril 5 mg, we may have to titrate this up, I did discuss this with her in the office  She will go for an echocardiogram as discussed and if she has any worsening or changing symptoms she will be seen urgently at our urgent care or emergency department, otherwise I will follow her up in 2 weeks for blood pressure recheck in evaluation of medication stability

## 2019-07-20 LAB
BUN SERPL-MCNC: 11 MG/DL (ref 6–24)
BUN/CREAT SERPL: 13 (ref 9–23)
CALCIUM SERPL-MCNC: 9.8 MG/DL (ref 8.7–10.2)
CHLORIDE SERPL-SCNC: 104 MMOL/L (ref 96–106)
CO2 SERPL-SCNC: 27 MMOL/L (ref 20–29)
CREAT SERPL-MCNC: 0.85 MG/DL (ref 0.57–1)
GLUCOSE SERPL-MCNC: 74 MG/DL (ref 65–99)
POTASSIUM SERPL-SCNC: 4.5 MMOL/L (ref 3.5–5.2)
SODIUM SERPL-SCNC: 146 MMOL/L (ref 134–144)

## 2019-07-22 NOTE — PROGRESS NOTES
Very mild elevation in sodium (may be due to dehydration) but other electrolytes and kidney fxn all normal, sugar normal as well

## 2019-07-24 ENCOUNTER — HOSPITAL ENCOUNTER (OUTPATIENT)
Dept: NON INVASIVE DIAGNOSTICS | Age: 54
Discharge: HOME | End: 2019-07-24
Attending: FAMILY MEDICINE
Payer: COMMERCIAL

## 2019-07-24 VITALS
DIASTOLIC BLOOD PRESSURE: 97 MMHG | BODY MASS INDEX: 25.88 KG/M2 | WEIGHT: 161 LBS | SYSTOLIC BLOOD PRESSURE: 149 MMHG | HEIGHT: 66 IN

## 2019-07-24 DIAGNOSIS — I51.7 LVH (LEFT VENTRICULAR HYPERTROPHY): ICD-10-CM

## 2019-07-24 DIAGNOSIS — R94.31 ABNORMAL EKG: ICD-10-CM

## 2019-07-24 LAB
ECHO AO ROOT DIAM: 3.37 CM
ECHO AV AREA PEAK VELOCITY: 2.5 CM2
ECHO AV PEAK GRADIENT: 8.5 MMHG
ECHO AV PEAK VELOCITY: 146 CM/S
ECHO EST RA PRESSURE: 3 MMHG
ECHO LA MAJOR AXIS: 3.97 CM
ECHO LA TO AORTIC ROOT RATIO: 1.18
ECHO LA VOL 2C: 78.92 ML (ref 22–52)
ECHO LA VOL 4C: 66.66 ML (ref 22–52)
ECHO LA VOL BP: 79.48 ML (ref 22–52)
ECHO LA VOL/BSA BIPLANE: 43.57 ML/M2 (ref 16–28)
ECHO LA VOLUME INDEX A2C: 43.27 ML/M2 (ref 16–28)
ECHO LA VOLUME INDEX A4C: 36.55 ML/M2 (ref 16–28)
ECHO LV INTERNAL DIMENSION DIASTOLIC: 4.72 CM (ref 3.9–5.3)
ECHO LV INTERNAL DIMENSION SYSTOLIC: 3.09 CM
ECHO LV IVSD: 0.89 CM (ref 0.6–0.9)
ECHO LV MASS 2D: 177.7 G (ref 67–162)
ECHO LV MASS INDEX 2D: 97.4 G/M2 (ref 43–95)
ECHO LV POSTERIOR WALL DIASTOLIC: 1 CM (ref 0.6–0.9)
ECHO LVOT DIAM: 2.07 CM
ECHO LVOT PEAK GRADIENT: 4.9 MMHG
ECHO LVOT PEAK VELOCITY: 110.43 CM/S
ECHO MV A VELOCITY: 75.41 CM/S
ECHO MV E DECELERATION TIME (DT): 237.1 MS
ECHO MV E VELOCITY: 77.25 CM/S
ECHO MV E/A RATIO: 1
ECHO MV REGURGITANT PEAK GRADIENT: 43.8 MMHG
ECHO MV REGURGITANT PEAK VELOCITY: 330.98 CM/S
ECHO PULMONARY ARTERY SYSTOLIC PRESSURE (PASP): 30.4 MMHG
ECHO PV MAX VELOCITY: 84.62 CM/S
ECHO PV PEAK GRADIENT: 2.9 MMHG
ECHO RIGHT VENTRICULAR SYSTOLIC PRESSURE (RVSP): 30.4 MMHG
ECHO RV INTERNAL DIMENSION: 3.29 CM
ECHO TV REGURGITANT MAX VELOCITY: 261.95 CM/S
ECHO TV REGURGITANT PEAK GRADIENT: 27.4 MMHG

## 2019-07-24 PROCEDURE — 93306 TTE W/DOPPLER COMPLETE: CPT

## 2019-07-31 NOTE — PROGRESS NOTES
Echo reviewed, pumping out the expected amount of blood but willing is mildly dysfunctional, very mild regurgitation of the mitral valve and the tricuspid valve  Keep follow-up appointment

## 2019-08-01 ENCOUNTER — OFFICE VISIT (OUTPATIENT)
Dept: FAMILY MEDICINE CLINIC | Age: 54
End: 2019-08-01

## 2019-08-01 VITALS
BODY MASS INDEX: 25.71 KG/M2 | DIASTOLIC BLOOD PRESSURE: 96 MMHG | TEMPERATURE: 98 F | HEART RATE: 75 BPM | SYSTOLIC BLOOD PRESSURE: 128 MMHG | WEIGHT: 160 LBS | HEIGHT: 66 IN | RESPIRATION RATE: 18 BRPM

## 2019-08-01 DIAGNOSIS — I10 ESSENTIAL HYPERTENSION: Primary | ICD-10-CM

## 2019-08-01 NOTE — PROGRESS NOTES
Family Medicine Follow-Up Progress Note  Patient: Destin Sandoval  1965, 48 y.o., female  Encounter Date: 8/1/2019    ASSESSMENT & PLAN    ICD-10-CM ICD-9-CM    1. Essential hypertension I10 401.9        No orders of the defined types were placed in this encounter. Patient Instructions   Here today for blood pressure follow-up, there was one episode at home her blood pressure was quite low and her heart rate was high but perhaps this was due to dehydration, the general trend has been in the 130s over 80s to 90s and she is still elevated in spite of 5 mg of lisinopril daily which she is tolerating well  We discussed some lifestyle interventions, increasing exercise and eating a low sodium and high potassium diet  For now she will continue on the 5 mg of lisinopril, she has a follow-up appointment in 3 weeks for a physical and at that time if the diastolic blood pressure is still in the high 80s or low 90s we will increase her medication however if it is improved we can stay the course  We reviewed her echocardiogram in detail and also her metabolic panel today and neither were significantly concerning, there was some grade 1 diastolic dysfunction on the echo that we will be mindful of in the future  Call with questions or concerns otherwise keep upcoming appointment in about 3 weeks      CHIEF COMPLAINT  Chief Complaint   Patient presents with    Hypertension     follow up        Jo Gutiérrez is a 48 y.o. female presenting today for follow-up on blood pressure. She had her echocardiogram since last visit and her EF is 61 to 65%, there are no valvular abnormalities, pressures are appropriate, there was grade 1 diastolic dysfunction.   She had a metabolic panel as well and her potassium was 4.5, sodium was slightly elevated at 146 which correlates with mildly dehydrated status which may also have been seen on her IVC when she had her echo  She is feeling well today, she has been checking her blood pressure at home and she is running in the high 130s to 140s over high 80s to 90s in general  She is tolerating the medication she has no side effects  She is very interested in doing some decrease her blood pressure rather than changing any medications around the neckline no headache or vision changes, no chest pain or shortness of breath, no leg swelling    Review of Systems  A 12 point review of systems was negative except as noted here or in the HPI. OBJECTIVE  Visit Vitals  BP (!) 128/96   Pulse 75   Temp 98 °F (36.7 °C) (Oral)   Resp 18   Ht 5' 6\" (1.676 m)   Wt 160 lb (72.6 kg)   BMI 25.82 kg/m²       Physical Exam   Constitutional: She is oriented to person, place, and time. She appears well-developed and well-nourished. No distress. NAD, Nontoxic, Appears Stated Age   HENT:   Head: Normocephalic and atraumatic. Mouth/Throat: Oropharynx is clear and moist.   Eyes: Conjunctivae and EOM are normal. Right eye exhibits no discharge. Left eye exhibits no discharge. No scleral icterus. Neck: Neck supple. No JVD present. No thyromegaly present. No carotid bruit   Cardiovascular: Normal rate, regular rhythm, normal heart sounds and intact distal pulses. Exam reveals no gallop and no friction rub. No murmur heard. Pulmonary/Chest: Effort normal and breath sounds normal. No stridor. No respiratory distress. She has no wheezes. She has no rales. She exhibits no tenderness. Abdominal: Soft. Bowel sounds are normal. She exhibits no distension. There is no tenderness. There is no rebound and no guarding. Musculoskeletal: She exhibits no edema or tenderness. Lymphadenopathy:     She has no cervical adenopathy. Neurological: She is alert and oriented to person, place, and time. Grossly intact CN   Skin: Skin is warm and dry. No rash noted. She is not diaphoretic. Psychiatric: She has a normal mood and affect. Her behavior is normal.   Nursing note and vitals reviewed.       No results found for any visits on 19.     HISTORICAL  Reviewed and updated today, and as noted below:    Past Medical History:   Diagnosis Date    Anemia 10/31/2011    Anemia NEC     Dr. Luis Eduardo Lundberg     Past Surgical History:   Procedure Laterality Date    ENDOSCOPY, COLON, DIAGNOSTIC  10/07    HX  SECTION      HX TONSILLECTOMY       Family History   Problem Relation Age of Onset    Hypertension Mother     Hypertension Sister     Other Maternal Grandmother         anyeurism     Social History     Tobacco Use   Smoking Status Never Smoker   Smokeless Tobacco Never Used     Social History     Socioeconomic History    Marital status:      Spouse name: Not on file    Number of children: Not on file    Years of education: Not on file    Highest education level: Not on file   Tobacco Use    Smoking status: Never Smoker    Smokeless tobacco: Never Used   Substance and Sexual Activity    Alcohol use: No    Drug use: No     Allergies   Allergen Reactions    Lactose Diarrhea       Hospital Outpatient Visit on 2019   Component Date Value Ref Range Status    LA Volume 2019 79.48  22 - 52 mL Final    Ao Root D 2019 3.37  cm Final    Aortic Valve Systolic Peak Velocity  146.00  cm/s Final    Aortic Valve Area by Continuity of* 2019 2.5  cm2 Final    AoV PG 2019 8.5  mmHg Final    LVIDd 2019 4.72  3.9 - 5.3 cm Final    LVPWd 2019 1.00* 0.6 - 0.9 cm Final    LVIDs 2019 3.09  cm Final    IVSd 2019 0.89  0.6 - 0.9 cm Final    LVOT d 2019 2.07  cm Final    LVOT Peak Velocity 2019 110.43  cm/s Final    LVOT Peak Gradient 2019 4.9  mmHg Final    MV A Mehul 2019 75.41  cm/s Final    MV E Mehul 2019 77.25  cm/s Final    MV E/A 2019 1.00   Final    Left Atrium to Aortic Root Ratio 2019 1.18   Final    RVIDd 2019 3.29  cm Final    LA Vol 4C 2019 66.66* 22 - 52 mL Final    LA Vol 2C 2019 78.92* 22 - 52 mL Final    LV Mass AL 07/24/2019 177.7* 67 - 162 g Final    LV Mass AL Index 07/24/2019 97.4  43 - 95 g/m2 Final    RVSP 07/24/2019 30.4  mmHg Final    Est. RA Pressure 07/24/2019 3.0  mmHg Final    Mitral Regurgitant Peak Velocity 07/24/2019 330.98  cm/s Final    Mitral Valve E Wave Deceleration T* 07/24/2019 237.1  ms Final    Left Atrium Major Axis 07/24/2019 3.97  cm Final    Triscuspid Valve Regurgitation Pea* 07/24/2019 27.4  mmHg Final    Pulmonic Valve Max Velocity 07/24/2019 84.62  cm/s Final    TR Max Velocity 07/24/2019 261.95  cm/s Final    LA Vol Index 07/24/2019 43.57  16 - 28 ml/m2 Final    PASP 07/24/2019 30.4  mmHg Final    LA Vol Index 07/24/2019 43.27  16 - 28 ml/m2 Final    LA Vol Index 07/24/2019 36.55  16 - 28 ml/m2 Final    MR Peak Gradient 07/24/2019 43.8  mmHg Final    PV peak gradient 07/24/2019 2.9  mmHg Final   Office Visit on 07/19/2019   Component Date Value Ref Range Status    Glucose 07/19/2019 74  65 - 99 mg/dL Final    BUN 07/19/2019 11  6 - 24 mg/dL Final    Creatinine 07/19/2019 0.85  0.57 - 1.00 mg/dL Final    GFR est non-AA 07/19/2019 79  >59 mL/min/1.73 Final    GFR est AA 07/19/2019 91  >59 mL/min/1.73 Final    BUN/Creatinine ratio 07/19/2019 13  9 - 23 Final    Sodium 07/19/2019 146* 134 - 144 mmol/L Final    Potassium 07/19/2019 4.5  3.5 - 5.2 mmol/L Final    Chloride 07/19/2019 104  96 - 106 mmol/L Final    CO2 07/19/2019 27  20 - 29 mmol/L Final    Calcium 07/19/2019 9.8  8.7 - 10.2 mg/dL Final         Cassidy Altamirano MD  [facility]  08/01/19 8:25 AM    Portions of this note may have been populated using smart dictation software and may have \"sounds-like\" errors present. Pt was counseled on risks, benefits and alternatives of treatment options. All questions were asked and answered and the patient was agreeable with the treatment plan as outlined.

## 2019-08-01 NOTE — PATIENT INSTRUCTIONS
Here today for blood pressure follow-up, there was one episode at home her blood pressure was quite low and her heart rate was high but perhaps this was due to dehydration, the general trend has been in the 130s over 80s to 90s and she is still elevated in spite of 5 mg of lisinopril daily which she is tolerating well  We discussed some lifestyle interventions, increasing exercise and eating a low sodium and high potassium diet  For now she will continue on the 5 mg of lisinopril, she has a follow-up appointment in 3 weeks for a physical and at that time if the diastolic blood pressure is still in the high 80s or low 90s we will increase her medication however if it is improved we can stay the course  We reviewed her echocardiogram in detail and also her metabolic panel today and neither were significantly concerning, there was some grade 1 diastolic dysfunction on the echo that we will be mindful of in the future  Call with questions or concerns otherwise keep upcoming appointment in about 3 weeks

## 2019-08-23 ENCOUNTER — OFFICE VISIT (OUTPATIENT)
Dept: FAMILY MEDICINE CLINIC | Age: 54
End: 2019-08-23

## 2019-08-23 VITALS
SYSTOLIC BLOOD PRESSURE: 141 MMHG | RESPIRATION RATE: 16 BRPM | HEIGHT: 66 IN | OXYGEN SATURATION: 100 % | WEIGHT: 157.8 LBS | TEMPERATURE: 97.9 F | HEART RATE: 69 BPM | BODY MASS INDEX: 25.36 KG/M2 | DIASTOLIC BLOOD PRESSURE: 98 MMHG

## 2019-08-23 DIAGNOSIS — Z00.00 WELL WOMAN EXAM WITHOUT GYNECOLOGICAL EXAM: Primary | ICD-10-CM

## 2019-08-23 DIAGNOSIS — I10 ESSENTIAL HYPERTENSION: ICD-10-CM

## 2019-08-23 DIAGNOSIS — Z12.11 SCREENING FOR COLON CANCER: ICD-10-CM

## 2019-08-23 LAB — MAMMOGRAPHY, EXTERNAL: NORMAL

## 2019-08-23 RX ORDER — LISINOPRIL 10 MG/1
10 TABLET ORAL DAILY
Qty: 90 TAB | Refills: 0 | Status: SHIPPED | OUTPATIENT
Start: 2019-08-23 | End: 2019-11-13 | Stop reason: SDUPTHER

## 2019-08-23 NOTE — PROGRESS NOTES
Subjective:   48 y.o. female for Well Woman Check. Her gyne and breast care is done elsewhere by her Ob-Gyne physician. Patient Active Problem List   Diagnosis Code    Anemia NEC     Disturbance of skin sensation R20.9    Migraine G43.909     Current Outpatient Medications   Medication Sig Dispense Refill    lisinopril (PRINIVIL, ZESTRIL) 10 mg tablet Take 1 Tab by mouth daily. 90 Tab 0    multivitamin (ONE A DAY) tablet Take 1 Tab by mouth daily. Allergies   Allergen Reactions    Lactose Diarrhea     Past Medical History:   Diagnosis Date    Anemia 10/31/2011    Anemia NEC     Dr. Shanita Mahoney     Past Surgical History:   Procedure Laterality Date    ENDOSCOPY, COLON, DIAGNOSTIC  10/07    HX  SECTION      HX TONSILLECTOMY       Family History   Problem Relation Age of Onset   [place] Hypertension Mother     Hypertension Sister     Other Maternal Grandmother         anyeurism     Social History     Tobacco Use    Smoking status: Never Smoker    Smokeless tobacco: Never Used   Substance Use Topics    Alcohol use: No        No results found for: WBC, WBCT, WBCPOC, HGB, HGBPOC, HCT, HCTPOC, PLT, PLTPOC, MCV, MCVPOC, HGBEXT, HCTEXT, PLTEXT, HGBEXT, HCTEXT, PLTEXT  Lab Results   Component Value Date/Time    Cholesterol, total 166 2018 11:13 AM    HDL Cholesterol 68 2018 11:13 AM    LDL, calculated 90 2018 11:13 AM    Triglyceride 41 2018 11:13 AM     Lab Results   Component Value Date/Time    ALT (SGPT) 23 2018 11:13 AM    AST (SGOT) 26 2018 11:13 AM    Alk. phosphatase CANCELED 2018 11:13 AM    Bilirubin, total 0.6 2018 11:13 AM    Albumin 4.6 2018 11:13 AM    Protein, total 7.5 2018 11:13 AM     Lab Results   Component Value Date/Time    Glucose 74 2019 11:48 AM         ROS: Feeling generally well. No TIA's or unusual headaches, no dysphagia. No prolonged cough. No dyspnea or chest pain on exertion.   No abdominal pain, change in bowel habits, black or bloody stools. No urinary tract symptoms. No new or unusual musculoskeletal symptoms. Specific concerns today: HTN    BP readings at home sometimes 120s/80s, patient reports those oare the lowest, the highest reading she remembers at home 130s/90s    No tobacco  Rare etoh  No illicit drugs  Wears seatbelt/helmet  Eating a healthy diet  Trying to exercise as recommended  Feels resistant to using medications, does not want to be \"Dependent\" on meds and make bp refractory to them, wants more natural alternatives to managing bp  Objective:     Visit Vitals  BP (!) 141/98 (BP 1 Location: Left arm, BP Patient Position: Sitting)   Pulse 69   Temp 97.9 °F (36.6 °C) (Oral)   Resp 16   Ht 5' 6\" (1.676 m)   Wt 157 lb 12.8 oz (71.6 kg)   LMP  (LMP Unknown)   SpO2 100%   BMI 25.47 kg/m²     Physical Exam   Constitutional: She is oriented to person, place, and time. She appears well-developed and well-nourished. No distress. NAD, Nontoxic, Appears Stated Age   HENT:   Head: Normocephalic and atraumatic. Mouth/Throat: Oropharynx is clear and moist.   Eyes: Conjunctivae and EOM are normal. Right eye exhibits no discharge. Left eye exhibits no discharge. No scleral icterus. Neck: Neck supple. No JVD present. No thyromegaly present. No carotid bruit   Cardiovascular: Normal rate, regular rhythm, normal heart sounds and intact distal pulses. Exam reveals no gallop and no friction rub. No murmur heard. Pulmonary/Chest: Effort normal and breath sounds normal. No stridor. No respiratory distress. She has no wheezes. She has no rales. She exhibits no tenderness. Abdominal: Soft. Bowel sounds are normal. She exhibits no distension. There is no tenderness. There is no rebound and no guarding. Musculoskeletal: She exhibits no edema or tenderness. Lymphadenopathy:     She has no cervical adenopathy. Neurological: She is alert and oriented to person, place, and time.    Grossly intact CN   Skin: Skin is warm and dry. No rash noted. She is not diaphoretic. Psychiatric: She has a normal mood and affect. Her behavior is normal.   Nursing note and vitals reviewed. Assessment/Plan:   Well Woman  bring BP log to office visit, continue present diet with no restrictions, call if any problems    ICD-10-CM ICD-9-CM    1. Well woman exam without gynecological exam Z00.00 V70.0    2. Essential hypertension I10 401.9 lisinopril (PRINIVIL, ZESTRIL) 10 mg tablet   3.  Screening for colon cancer Z12.11 V76.51 REFERRAL TO GASTROENTEROLOGY

## 2019-08-23 NOTE — PROGRESS NOTES
Chief Complaint   Patient presents with    Complete Physical     1. Have you been to the ER, urgent care clinic since your last visit? Hospitalized since your last visit? No    2. Have you seen or consulted any other health care providers outside of the [address] [place] [address] since your last visit? Include any pap smears or colon screening.  No

## 2019-10-15 ENCOUNTER — OFFICE VISIT (OUTPATIENT)
Dept: FAMILY MEDICINE CLINIC | Age: 54
End: 2019-10-15

## 2019-10-15 VITALS
BODY MASS INDEX: 25.58 KG/M2 | TEMPERATURE: 98.2 F | DIASTOLIC BLOOD PRESSURE: 84 MMHG | SYSTOLIC BLOOD PRESSURE: 128 MMHG | RESPIRATION RATE: 16 BRPM | HEIGHT: 66 IN | WEIGHT: 159.2 LBS | HEART RATE: 78 BPM | OXYGEN SATURATION: 100 %

## 2019-10-15 DIAGNOSIS — Z12.11 SCREENING FOR COLON CANCER: ICD-10-CM

## 2019-10-15 DIAGNOSIS — I10 ESSENTIAL HYPERTENSION: Primary | ICD-10-CM

## 2019-10-15 NOTE — PROGRESS NOTES
Family Medicine Follow-Up Progress Note  Patient: Whitley Yanez  1965, 48 y.o., female  Encounter Date: 10/15/2019    ASSESSMENT & PLAN    ICD-10-CM ICD-9-CM    1. Essential hypertension I10 401.9    2. Screening for colon cancer Z12.11 V76.51 OCCULT BLOOD IMMUNOASSAY,DIAGNOSTIC       Orders Placed This Encounter    OCCULT BLOOD IMMUNOASSAY,DIAGNOSTIC       Patient Instructions   Diastolic blood pressure high normal control, systolic blood pressure adequately controlled  For now continue with that lisinopril at the current dose  Continue to follow a healthy diet and exercise regularly, I encourage you to keep tracking with your Fitbit  Follow-up in 6 months or sooner on an as-needed basis and call with questions or concerns  Flu shot declined  Colon cancer screening as ordered      CHIEF COMPLAINT  Chief Complaint   Patient presents with    Hypertension     Follow up       Maria Guadalupe Archer is a 48 y.o. female presenting today for bp follow up. Declines the flu shot. On home scale has lost about 10 lbs, she is working hard at her diet and she has a health . She is exercising and getting her steps in most days  She is compliant with her lisinopril she reports--takes at night, no side effects and feeling well overall  Review of Systems  A 12 point review of systems was negative except as noted here or in the HPI. OBJECTIVE  Visit Vitals  /84   Pulse 78   Temp 98.2 °F (36.8 °C) (Oral)   Resp 16   Ht 5' 6\" (1.676 m)   Wt 159 lb 3.2 oz (72.2 kg)   SpO2 100%   BMI 25.70 kg/m²       Physical Exam   Constitutional: She is oriented to person, place, and time. She appears well-developed and well-nourished. No distress. NAD, Nontoxic, Appears Stated Age   HENT:   Head: Normocephalic and atraumatic. Mouth/Throat: Oropharynx is clear and moist.   Eyes: Conjunctivae and EOM are normal. Right eye exhibits no discharge. Left eye exhibits no discharge. No scleral icterus.    Neck: Neck supple. No JVD present. No thyromegaly present. No carotid bruit   Cardiovascular: Normal rate, regular rhythm, normal heart sounds and intact distal pulses. Exam reveals no gallop and no friction rub. No murmur heard. Pulmonary/Chest: Effort normal and breath sounds normal. No stridor. No respiratory distress. She has no wheezes. She has no rales. She exhibits no tenderness. Abdominal: Soft. Bowel sounds are normal. She exhibits no distension. There is no tenderness. There is no rebound and no guarding. Musculoskeletal: She exhibits no edema or tenderness. Lymphadenopathy:     She has no cervical adenopathy. Neurological: She is alert and oriented to person, place, and time. Grossly intact CN   Skin: Skin is warm and dry. No rash noted. She is not diaphoretic. Psychiatric: She has a normal mood and affect. Her behavior is normal.   Nursing note and vitals reviewed. No results found for any visits on 10/15/19.     HISTORICAL  Reviewed and updated today, and as noted below:    Past Medical History:   Diagnosis Date    Anemia 10/31/2011    Anemia NEC     Dr. Melinda Narayanan     Past Surgical History:   Procedure Laterality Date    ENDOSCOPY, COLON, DIAGNOSTIC  10/07    HX  SECTION      HX TONSILLECTOMY       Family History   Problem Relation Age of Onset   [facility] Hypertension Mother     Hypertension Sister     Other Maternal Grandmother         anyeurism     Social History     Tobacco Use   Smoking Status Never Smoker   Smokeless Tobacco Never Used     Social History     Socioeconomic History    Marital status:      Spouse name: Not on file    Number of children: Not on file    Years of education: Not on file    Highest education level: Not on file   Tobacco Use    Smoking status: Never Smoker    Smokeless tobacco: Never Used   Substance and Sexual Activity    Alcohol use: No    Drug use: No     Allergies   Allergen Reactions    Lactose Diarrhea       Orders Only on 2019 Component Date Value Ref Range Status    Mammography, External 08/23/2019 normal    Final   Hospital Outpatient Visit on 07/24/2019   Component Date Value Ref Range Status    LA Volume 07/24/2019 79.48  22 - 52 mL Final    Ao Root D 07/24/2019 3.37  cm Final    Aortic Valve Systolic Peak Velocity 15/29/0654 146.00  cm/s Final    Aortic Valve Area by Continuity of* 07/24/2019 2.5  cm2 Final    AoV PG 07/24/2019 8.5  mmHg Final    LVIDd 07/24/2019 4.72  3.9 - 5.3 cm Final    LVPWd 07/24/2019 1.00* 0.6 - 0.9 cm Final    LVIDs 07/24/2019 3.09  cm Final    IVSd 07/24/2019 0.89  0.6 - 0.9 cm Final    LVOT d 07/24/2019 2.07  cm Final    LVOT Peak Velocity 07/24/2019 110.43  cm/s Final    LVOT Peak Gradient 07/24/2019 4.9  mmHg Final    MV A Mehul 07/24/2019 75.41  cm/s Final    MV E Mehul 07/24/2019 77.25  cm/s Final    MV E/A 07/24/2019 1.00   Final    Left Atrium to Aortic Root Ratio 07/24/2019 1.18   Final    RVIDd 07/24/2019 3.29  cm Final    LA Vol 4C 07/24/2019 66.66* 22 - 52 mL Final    LA Vol 2C 07/24/2019 78.92* 22 - 52 mL Final    LV Mass AL 07/24/2019 177.7* 67 - 162 g Final    LV Mass AL Index 07/24/2019 97.4  43 - 95 g/m2 Final    RVSP 07/24/2019 30.4  mmHg Final    Est. RA Pressure 07/24/2019 3.0  mmHg Final    Mitral Regurgitant Peak Velocity 07/24/2019 330.98  cm/s Final    Mitral Valve E Wave Deceleration T* 07/24/2019 237.1  ms Final    Left Atrium Major Axis 07/24/2019 3.97  cm Final    Triscuspid Valve Regurgitation Pea* 07/24/2019 27.4  mmHg Final    Pulmonic Valve Max Velocity 07/24/2019 84.62  cm/s Final    TR Max Velocity 07/24/2019 261.95  cm/s Final    LA Vol Index 07/24/2019 43.57  16 - 28 ml/m2 Final    PASP 07/24/2019 30.4  mmHg Final    LA Vol Index 07/24/2019 43.27  16 - 28 ml/m2 Final    LA Vol Index 07/24/2019 36.55  16 - 28 ml/m2 Final    MR Peak Gradient 07/24/2019 43.8  mmHg Final    PV peak gradient 07/24/2019 2.9  mmHg Final   Office Visit on 07/19/2019   Component Date Value Ref Range Status    Glucose 07/19/2019 74  65 - 99 mg/dL Final    BUN 07/19/2019 11  6 - 24 mg/dL Final    Creatinine 07/19/2019 0.85  0.57 - 1.00 mg/dL Final    GFR est non-AA 07/19/2019 79  >59 mL/min/1.73 Final    GFR est AA 07/19/2019 91  >59 mL/min/1.73 Final    BUN/Creatinine ratio 07/19/2019 13  9 - 23 Final    Sodium 07/19/2019 146* 134 - 144 mmol/L Final    Potassium 07/19/2019 4.5  3.5 - 5.2 mmol/L Final    Chloride 07/19/2019 104  96 - 106 mmol/L Final    CO2 07/19/2019 27  20 - 29 mmol/L Final    Calcium 07/19/2019 9.8  8.7 - 10.2 mg/dL Final         Flako Delgado MD  [facility]  10/15/19 3:22 PM    Portions of this note may have been populated using smart dictation software and may have \"sounds-like\" errors present. Pt was counseled on risks, benefits and alternatives of treatment options. All questions were asked and answered and the patient was agreeable with the treatment plan as outlined.

## 2019-10-15 NOTE — PROGRESS NOTES
Chief Complaint   Patient presents with    Hypertension     Follow up     1. Have you been to the ER, urgent care clinic since your last visit? Hospitalized since your last visit? No    2. Have you seen or consulted any other health care providers outside of the [address] [place] [address] since your last visit? Include any pap smears or colon screening.  No

## 2019-10-15 NOTE — PATIENT INSTRUCTIONS
Diastolic blood pressure high normal control, systolic blood pressure adequately controlled  For now continue with that lisinopril at the current dose  Continue to follow a healthy diet and exercise regularly, I encourage you to keep tracking with your Fitbit  Follow-up in 6 months or sooner on an as-needed basis and call with questions or concerns  Flu shot declined  Colon cancer screening as ordered

## 2019-11-13 DIAGNOSIS — I10 ESSENTIAL HYPERTENSION: ICD-10-CM

## 2019-11-14 RX ORDER — LISINOPRIL 10 MG/1
TABLET ORAL
Qty: 90 TAB | Refills: 0 | Status: SHIPPED | OUTPATIENT
Start: 2019-11-14 | End: 2020-03-23

## 2020-02-24 ENCOUNTER — APPOINTMENT (OUTPATIENT)
Dept: GENERAL RADIOLOGY | Age: 55
End: 2020-02-24
Attending: EMERGENCY MEDICINE
Payer: COMMERCIAL

## 2020-02-24 ENCOUNTER — HOSPITAL ENCOUNTER (EMERGENCY)
Age: 55
Discharge: HOME | End: 2020-02-24
Attending: EMERGENCY MEDICINE
Payer: COMMERCIAL

## 2020-02-24 ENCOUNTER — OFFICE VISIT (OUTPATIENT)
Dept: FAMILY MEDICINE CLINIC | Age: 55
End: 2020-02-24

## 2020-02-24 ENCOUNTER — DOCUMENTATION ONLY (OUTPATIENT)
Dept: CARDIOLOGY CLINIC | Age: 55
End: 2020-02-24

## 2020-02-24 VITALS
HEIGHT: 66 IN | SYSTOLIC BLOOD PRESSURE: 133 MMHG | RESPIRATION RATE: 16 BRPM | DIASTOLIC BLOOD PRESSURE: 92 MMHG | BODY MASS INDEX: 25.39 KG/M2 | OXYGEN SATURATION: 100 % | HEART RATE: 65 BPM | WEIGHT: 158 LBS | TEMPERATURE: 98.4 F

## 2020-02-24 VITALS
RESPIRATION RATE: 18 BRPM | HEIGHT: 66 IN | HEART RATE: 76 BPM | WEIGHT: 158 LBS | DIASTOLIC BLOOD PRESSURE: 101 MMHG | TEMPERATURE: 96.5 F | SYSTOLIC BLOOD PRESSURE: 147 MMHG | BODY MASS INDEX: 25.39 KG/M2

## 2020-02-24 DIAGNOSIS — R55 NEAR SYNCOPE: Primary | ICD-10-CM

## 2020-02-24 DIAGNOSIS — R00.2 PALPITATIONS: Primary | ICD-10-CM

## 2020-02-24 DIAGNOSIS — R61 DIAPHORESIS: ICD-10-CM

## 2020-02-24 DIAGNOSIS — R07.89 CHEST PRESSURE: ICD-10-CM

## 2020-02-24 DIAGNOSIS — R00.2 PALPITATIONS: ICD-10-CM

## 2020-02-24 DIAGNOSIS — I10 ESSENTIAL HYPERTENSION: ICD-10-CM

## 2020-02-24 LAB
ALBUMIN SERPL-MCNC: 3.9 G/DL (ref 3.5–5)
ALBUMIN/GLOB SERPL: 1.1 {RATIO} (ref 1.1–2.2)
ALP SERPL-CCNC: 89 U/L (ref 45–117)
ALT SERPL-CCNC: 37 U/L (ref 12–78)
ANION GAP SERPL CALC-SCNC: 2 MMOL/L (ref 5–15)
AST SERPL-CCNC: 26 U/L (ref 15–37)
ATRIAL RATE: 59 BPM
BASOPHILS # BLD: 0 K/UL (ref 0–0.1)
BASOPHILS NFR BLD: 0 % (ref 0–1)
BILIRUB SERPL-MCNC: 0.6 MG/DL (ref 0.2–1)
BUN SERPL-MCNC: 15 MG/DL (ref 6–20)
BUN/CREAT SERPL: 19 (ref 12–20)
CALCIUM SERPL-MCNC: 9.2 MG/DL (ref 8.5–10.1)
CALCULATED P AXIS, ECG09: 51 DEGREES
CALCULATED R AXIS, ECG10: 19 DEGREES
CALCULATED T AXIS, ECG11: 1 DEGREES
CHLORIDE SERPL-SCNC: 107 MMOL/L (ref 97–108)
CO2 SERPL-SCNC: 31 MMOL/L (ref 21–32)
COMMENT, HOLDF: NORMAL
CREAT SERPL-MCNC: 0.78 MG/DL (ref 0.55–1.02)
DIAGNOSIS, 93000: NORMAL
DIFFERENTIAL METHOD BLD: ABNORMAL
EOSINOPHIL # BLD: 0 K/UL (ref 0–0.4)
EOSINOPHIL NFR BLD: 1 % (ref 0–7)
ERYTHROCYTE [DISTWIDTH] IN BLOOD BY AUTOMATED COUNT: 13.4 % (ref 11.5–14.5)
GLOBULIN SER CALC-MCNC: 3.4 G/DL (ref 2–4)
GLUCOSE SERPL-MCNC: 88 MG/DL (ref 65–100)
HCT VFR BLD AUTO: 37.4 % (ref 35–47)
HGB BLD-MCNC: 12.1 G/DL (ref 11.5–16)
IMM GRANULOCYTES # BLD AUTO: 0 K/UL (ref 0–0.04)
IMM GRANULOCYTES NFR BLD AUTO: 0 % (ref 0–0.5)
LIPASE SERPL-CCNC: 74 U/L (ref 73–393)
LYMPHOCYTES # BLD: 1.1 K/UL (ref 0.8–3.5)
LYMPHOCYTES NFR BLD: 35 % (ref 12–49)
MAGNESIUM SERPL-MCNC: 2.1 MG/DL (ref 1.6–2.4)
MCH RBC QN AUTO: 32 PG (ref 26–34)
MCHC RBC AUTO-ENTMCNC: 32.4 G/DL (ref 30–36.5)
MCV RBC AUTO: 98.9 FL (ref 80–99)
MONOCYTES # BLD: 0.2 K/UL (ref 0–1)
MONOCYTES NFR BLD: 7 % (ref 5–13)
NEUTS SEG # BLD: 1.7 K/UL (ref 1.8–8)
NEUTS SEG NFR BLD: 57 % (ref 32–75)
NRBC # BLD: 0 K/UL (ref 0–0.01)
NRBC BLD-RTO: 0 PER 100 WBC
P-R INTERVAL, ECG05: 148 MS
PLATELET # BLD AUTO: 226 K/UL (ref 150–400)
PMV BLD AUTO: 9.9 FL (ref 8.9–12.9)
POTASSIUM SERPL-SCNC: 3.8 MMOL/L (ref 3.5–5.1)
PROT SERPL-MCNC: 7.3 G/DL (ref 6.4–8.2)
Q-T INTERVAL, ECG07: 414 MS
QRS DURATION, ECG06: 90 MS
QTC CALCULATION (BEZET), ECG08: 409 MS
RBC # BLD AUTO: 3.78 M/UL (ref 3.8–5.2)
SAMPLES BEING HELD,HOLD: NORMAL
SODIUM SERPL-SCNC: 140 MMOL/L (ref 136–145)
TROPONIN I SERPL-MCNC: <0.05 NG/ML
TSH SERPL DL<=0.05 MIU/L-ACNC: 1.04 UIU/ML (ref 0.36–3.74)
VENTRICULAR RATE, ECG03: 59 BPM
WBC # BLD AUTO: 3 K/UL (ref 3.6–11)

## 2020-02-24 PROCEDURE — 93005 ELECTROCARDIOGRAM TRACING: CPT

## 2020-02-24 PROCEDURE — 74011250636 HC RX REV CODE- 250/636: Performed by: EMERGENCY MEDICINE

## 2020-02-24 PROCEDURE — 71046 X-RAY EXAM CHEST 2 VIEWS: CPT

## 2020-02-24 PROCEDURE — 85025 COMPLETE CBC W/AUTO DIFF WBC: CPT

## 2020-02-24 PROCEDURE — 83690 ASSAY OF LIPASE: CPT

## 2020-02-24 PROCEDURE — 84443 ASSAY THYROID STIM HORMONE: CPT

## 2020-02-24 PROCEDURE — 99284 EMERGENCY DEPT VISIT MOD MDM: CPT

## 2020-02-24 PROCEDURE — 80053 COMPREHEN METABOLIC PANEL: CPT

## 2020-02-24 PROCEDURE — 36415 COLL VENOUS BLD VENIPUNCTURE: CPT

## 2020-02-24 PROCEDURE — 83735 ASSAY OF MAGNESIUM: CPT

## 2020-02-24 PROCEDURE — 84484 ASSAY OF TROPONIN QUANT: CPT

## 2020-02-24 RX ADMIN — SODIUM CHLORIDE 1000 ML: 900 INJECTION, SOLUTION INTRAVENOUS at 13:25

## 2020-02-24 NOTE — ED TRIAGE NOTES
Pt arrives with the c/c of medial chest pain that started this morning around 9 but has resolved at this time.

## 2020-02-24 NOTE — ED PROVIDER NOTES
48 y.o. female with past medical history significant for anemia who presents from home with chief complaint of chest pain. Pt complains of 2 episodes of chest pain with associated palpitations, and lightheadedness since yesterday morning. Pt states each episode lasted 10 minutes and resolved without intervention. Pt states she called EMS today during the episode, but symptoms had resolved prior to EMS arrival. Pt reports nausea currently. Pt states her blood pressure machine said she had an \"irregular heart rhythm\" yesterday. Pt states for the past year she has had intermittent episodes of her \"heart stopping\" that she has not seen a cardiologist for. Pt denies vomiting, leg swelling, abdominal pain, diarrhea, fever, chills, or cough. Pt denies hx of DVT or PE. There are no other acute medical concerns at this time. Social hx: Never Smoker. Denies EtOH Use. PCP: Taqueria Smith MD    Note written by Mehdi Vitale, as dictated by Lamont Santana, DO 1:02 PM      The history is provided by the patient.         Past Medical History:   Diagnosis Date    Anemia 10/31/2011    Anemia NEC     Dr. Mahendra Woods       Past Surgical History:   Procedure Laterality Date    ENDOSCOPY, COLON, DIAGNOSTIC  10/07    HX  SECTION      HX TONSILLECTOMY           Family History:   Problem Relation Age of Onset   [place] Hypertension Mother     Hypertension Sister     Other Maternal Grandmother         anyeurism       Social History     Socioeconomic History    Marital status:      Spouse name: Not on file    Number of children: Not on file    Years of education: Not on file    Highest education level: Not on file   Occupational History    Not on file   Social Needs    Financial resource strain: Not on file    Food insecurity:     Worry: Not on file     Inability: Not on file    Transportation needs:     Medical: Not on file     Non-medical: Not on file   Tobacco Use    Smoking status: Never Smoker  Smokeless tobacco: Never Used   Substance and Sexual Activity    Alcohol use: No    Drug use: No    Sexual activity: Not on file   Lifestyle    Physical activity:     Days per week: Not on file     Minutes per session: Not on file    Stress: Not on file   Relationships    Social connections:     Talks on phone: Not on file     Gets together: Not on file     Attends [employer] service: Not on file     Active member of club or organization: Not on file     Attends meetings of clubs or organizations: Not on file     Relationship status: Not on file    Intimate partner violence:     Fear of current or ex partner: Not on file     Emotionally abused: Not on file     Physically abused: Not on file     Forced sexual activity: Not on file   Other Topics Concern    Not on file   Social History Narrative    Not on file         ALLERGIES: Lactose    Review of Systems   Constitutional: Negative for activity change, appetite change, chills and fever. HENT: Negative for congestion, rhinorrhea, sinus pressure, sneezing and sore throat. Eyes: Negative for photophobia and visual disturbance. Respiratory: Negative for cough and shortness of breath. Cardiovascular: Positive for chest pain and palpitations. Gastrointestinal: Positive for nausea. Negative for abdominal pain, blood in stool, constipation, diarrhea and vomiting. Genitourinary: Negative for difficulty urinating, dysuria, flank pain, frequency, hematuria, menstrual problem, urgency, vaginal bleeding and vaginal discharge. Musculoskeletal: Negative for arthralgias, back pain, myalgias and neck pain. Skin: Negative for rash and wound. Neurological: Positive for light-headedness. Negative for syncope, weakness, numbness and headaches. Psychiatric/Behavioral: Negative for self-injury and suicidal ideas. All other systems reviewed and are negative.       Vitals:    02/24/20 1249   BP: (!) 143/94   Pulse: 62   Resp: 16   Temp: 98.4 °F (36.9 °C) SpO2: 100%   Weight: 71.7 kg (158 lb)   Height: 5' 6\" (1.676 m)            Physical Exam  Vitals signs and nursing note reviewed. Constitutional:       General: She is not in acute distress. Appearance: Normal appearance. She is well-developed. She is not diaphoretic. Comments: Anxious. HENT:      Head: Normocephalic and atraumatic. Nose: Nose normal.   Eyes:      Extraocular Movements: Extraocular movements intact. Conjunctiva/sclera: Conjunctivae normal.      Pupils: Pupils are equal, round, and reactive to light. Neck:      Musculoskeletal: Neck supple. Cardiovascular:      Rate and Rhythm: Normal rate and regular rhythm. Heart sounds: Normal heart sounds. Pulmonary:      Effort: Pulmonary effort is normal.      Breath sounds: Normal breath sounds. Abdominal:      General: There is no distension. Palpations: Abdomen is soft. Tenderness: There is no abdominal tenderness. Musculoskeletal:         General: No tenderness. Skin:     General: Skin is warm and dry. Neurological:      General: No focal deficit present. Mental Status: She is alert and oriented to person, place, and time. Cranial Nerves: No cranial nerve deficit. Sensory: No sensory deficit. Motor: No weakness. Coordination: Coordination normal.     Note written by Jame Rodriguez. Robles Vega, as dictated by Lily Rider, DO 1:03 PM       MDM    48 y.o. female presents with palpitations and associated chest pain, now resolved. Found to be in NSR and well appearing in NAD. AF with VSS. Labs returned showing no significant abnormalities. Neg troponin. CXR viewed by myself and read by radiology showing no acute abnormalities. Feel that sx secondary to palpitations, no noted further arrhythmia while in the ED. rec'd PCP and cardiology follow up for further evaluation of her sx with likely holter monitoring. Return precautions were given for worsening or concerns.  This was discussed with the patient at the bedside and she stated both understanding and agreement. Procedures    ED EKG interpretation:  Rhythm: sinus bradycardia; and regular . Rate (approx.): 59; Axis: normal; ST/T wave: normal; No acute ST/T wave abnormality suggestive of ischemia. Note written by Clyde Shea.  Rosario Barrios, as dictated by Karissa Crocker, DO 12:52 PM

## 2020-02-24 NOTE — PROGRESS NOTES
Chief Complaint   Patient presents with    Chest Pain     felt like she was going to pass out; 02/24/20; called 911 (has copy of EKG)     1. Have you been to the ER, urgent care clinic since your last visit? Hospitalized since your last visit? No     2. Have you seen or consulted any other health care providers outside of the [address] [place] [address] since your last visit? Include any pap smears or colon screening.  No

## 2020-02-24 NOTE — PROGRESS NOTES
HISTORY OF PRESENT ILLNESS  Yaz Epps is a 48 y.o. female. Patient presents with:  Chest Pain: felt like she was going to pass out; 02/24/20; called 911 (has copy of EKG)    The squad was call and she brings in their EKG, which was normal, and her blood pressures, which were 161/102 and 176/104. She reports a normal blood sugar. Evidently, the episode started at work this am, starting with palpitations and near syncope. It was associated with chest pressure and diaphoresis. She called her  and then 911. The entire episode lasted from about 9:52 to 10:10 am and resolved quickly, however she did develop nausea on the way over here. No family history of CAD. Also, she is concerned because when she took her blood pressure yesterday, her monitor showed and irregular heart beat and yesterday she felt slightly like she did today. Her pulse got up to 91. Review of Systems   Constitutional: Positive for diaphoresis and malaise/fatigue. Respiratory: Negative for shortness of breath. Cardiovascular: Positive for chest pain and palpitations. Negative for leg swelling. Gastrointestinal: Positive for nausea. Negative for vomiting. Neurological: Positive for dizziness. Negative for tingling, sensory change, speech change and focal weakness. Visit Vitals  BP (!) 146/94   Pulse 67   Temp 96.5 °F (35.8 °C) (Oral)   Resp 18   Ht 5' 6\" (1.676 m)   Wt 158 lb (71.7 kg)   BMI 25.50 kg/m²     BP Readings from Last 3 Encounters:   02/24/20 (!) 146/94   10/15/19 128/84   08/23/19 (!) 141/98   She is not orthostatic by BP or pulse  Physical Exam  Vitals signs and nursing note reviewed. Constitutional:       General: She is not in acute distress. Appearance: She is well-developed. She is not diaphoretic. Cardiovascular:      Rate and Rhythm: Normal rate and regular rhythm. Pulses:           Carotid pulses are 2+ on the right side and 2+ on the left side.        Radial pulses are 2+ on the right side and 2+ on the left side. Dorsalis pedis pulses are 2+ on the right side and 2+ on the left side. Posterior tibial pulses are 2+ on the right side and 2+ on the left side. Heart sounds: Normal heart sounds. No murmur. No friction rub. No gallop. Pulmonary:      Effort: Pulmonary effort is normal. No respiratory distress. Breath sounds: Normal breath sounds. No wheezing or rales. Musculoskeletal:      Right lower leg: No edema. Left lower leg: No edema. Skin:     General: Skin is warm and dry. Neurological:      Mental Status: She is alert and oriented to person, place, and time. EKG - NSR without STTW changes, normal intervals and axis, no hypertrophy     ASSESSMENT and PLAN    ICD-10-CM ICD-9-CM    1. Near syncope R55 780.2    2. Chest pressure R07.89 786.59    3. Diaphoresis R61 780.8    4. Palpitations R00.2 785.1    5. Essential hypertension I10 401.9         Near syncope with chest pressure, diaphoresis and palpitations, few cardiac risk factors but needs evaluation  Patient discussed with Cardiology, sent directly to ER, case discussed with ER physician    Follow-up and Dispositions    · Return per ER disposition. Reviewed plan of care. Patient has provided input and agrees with goals.

## 2020-02-25 ENCOUNTER — OFFICE VISIT (OUTPATIENT)
Dept: FAMILY MEDICINE CLINIC | Age: 55
End: 2020-02-25

## 2020-02-25 VITALS
BODY MASS INDEX: 25.39 KG/M2 | RESPIRATION RATE: 18 BRPM | SYSTOLIC BLOOD PRESSURE: 145 MMHG | TEMPERATURE: 97.5 F | WEIGHT: 158 LBS | DIASTOLIC BLOOD PRESSURE: 94 MMHG | HEIGHT: 66 IN | OXYGEN SATURATION: 99 % | HEART RATE: 74 BPM

## 2020-02-25 DIAGNOSIS — I10 ESSENTIAL HYPERTENSION: ICD-10-CM

## 2020-02-25 DIAGNOSIS — R61 DIAPHORESIS: ICD-10-CM

## 2020-02-25 DIAGNOSIS — R00.2 PALPITATIONS: Primary | ICD-10-CM

## 2020-02-25 NOTE — PROGRESS NOTES
Chief Complaint   Patient presents with   [facility] Follow Up     1. Have you been to the ER, urgent care clinic since your last visit? Hospitalized since your last visit? Yes 02/24/2020 [facility] ER, Chest pain. 2. Have you seen or consulted any other health care providers outside of the [address] [place] [address] since your last visit? Include any pap smears or colon screening.  No

## 2020-02-25 NOTE — PROGRESS NOTES
Family Medicine Acute Visit Progress Note  Patient: Neena Leone  1965, 48 y.o., female  Encounter Date: 2/25/2020    ASSESSMENT & PLAN    ICD-10-CM ICD-9-CM    1. Palpitations R00.2 785.1 CARDIAC HOLTER MONITOR      REFERRAL TO CARDIOLOGY   2. Diaphoresis R61 780.8 CARDIAC HOLTER MONITOR      REFERRAL TO CARDIOLOGY   3. Essential hypertension I10 401.9 CARDIAC HOLTER MONITOR      REFERRAL TO CARDIOLOGY       Orders Placed This Encounter   [facility]     Referral Priority:   Routine     Referral Type:   Consultation     Referral Reason:   Specialty Services Required     Referred to Provider:   Hali Nieves MD     Number of Visits Requested:   1       Patient Instructions   Patient seen yesterday in the emergency room for palpitations associated with diaphoresis and chest discomfort  EKG was relatively normal and unchanged from prior  No further episodes today  Had 2 L of normal saline in the hospital  Labs reviewed and relatively normal  No indication for why she is having these symptoms. I would recommend we check a Holter monitor and have her be evaluated by cardiology  I also recommended today that we increase her lisinopril from 10-20. She would like to wait a few weeks to see if her pressures come down and perhaps this is just an acute situation of stress. I think this is reasonable and I will see her back at our next visit. In the interim I would like for her to have her Holter monitor done and see 1 of the cardiologist to whom I have referred her    Encounter time today was 30 minutes and more than 50% of this encounter was spent in counseling face-to-face regarding Diagnosis, Patient Education, Medication Management, Compliance and Impressions.         CHIEF COMPLAINT  Chief Complaint   Patient presents with   [facility] Follow Up       John Bruno is a 48 y.o. female presenting today for ER follow up  Last Sunday after walking her dog she started with chest discomfort. She checked her BP at home and the BP meter said that she had an irregular heart rhythm and the suggestion was that she follow up with her doctor  She reports then when she went to work yesterday sitting at her computer and was calm and then she had chest discomfort again and she had persistence of that sensation and she started having increased HR as well. She called 911 and she felt very calm but she started sweating. She reports that the ambulance came and as they were talking to her the symptoms resolved and she suddenly felt better. She went in and had an EKG ultimately, labs were ok but BP was elevated. Came over here to our office, was able to be seen by Dr [facility] and she reports she started to feel hot and have sweating palms and couldn't tell if this was hot flash or the symptoms. She started again with symptoms and now had associated nausea on the ride over and [facility], had high bp and had orthostatics that were negative. She consulted cardiology who recommended going to the ER. EKG and xray were ok in the ER and she was dx with palpitations. Echo done in July 2019 showed Normal EF with G1DD, Trace MR and Trace TR    HTN: patient reports stable on medications. No chest pain, sob, headache, blurred vision, leg swelling, diaphoresis, falls. Compliant with medications as prescribed. is checking blood pressures at home and reports range is 120-150/85-95. No significant hyper or hypotensive episodes that the patient reports today. Review of Systems  A 12 point review of systems was negative except as noted here or in the HPI. OBJECTIVE  Visit Vitals  BP (!) 145/94 (BP 1 Location: Left arm, BP Patient Position: Sitting)   Pulse 74   Temp 97.5 °F (36.4 °C) (Oral)   Resp 18   Ht 5' 6\" (1.676 m)   Wt 158 lb (71.7 kg)   SpO2 99%   BMI 25.50 kg/m²       Physical Exam  Vitals signs and nursing note reviewed. Constitutional:       General: She is not in acute distress.      Appearance: Normal appearance. She is well-developed and normal weight. She is not ill-appearing, toxic-appearing or diaphoretic. HENT:      Head: Normocephalic and atraumatic. Right Ear: External ear normal.      Left Ear: External ear normal.      Nose: Nose normal. No congestion. Mouth/Throat:      Mouth: Mucous membranes are moist.      Pharynx: Oropharynx is clear. No oropharyngeal exudate or posterior oropharyngeal erythema. Eyes:      General: No scleral icterus. Right eye: No discharge. Left eye: No discharge. Extraocular Movements: Extraocular movements intact. Conjunctiva/sclera: Conjunctivae normal.      Pupils: Pupils are equal, round, and reactive to light. Neck:      Musculoskeletal: Normal range of motion and neck supple. Vascular: No carotid bruit. Cardiovascular:      Rate and Rhythm: Normal rate and regular rhythm. Heart sounds: Normal heart sounds. No murmur. No friction rub. No gallop. Pulmonary:      Effort: Pulmonary effort is normal. No respiratory distress. Breath sounds: Normal breath sounds. No stridor. No wheezing, rhonchi or rales. Abdominal:      General: Bowel sounds are normal. There is no distension. Palpations: Abdomen is soft. Tenderness: There is no abdominal tenderness. Musculoskeletal: Normal range of motion. General: No tenderness. Right lower leg: No edema. Left lower leg: No edema. Lymphadenopathy:      Cervical: No cervical adenopathy. Skin:     General: Skin is warm and dry. Capillary Refill: Capillary refill takes less than 2 seconds. Findings: No rash. Neurological:      General: No focal deficit present. Mental Status: She is alert and oriented to person, place, and time. Coordination: Coordination normal.      Gait: Gait normal.   Psychiatric:         Mood and Affect: Mood normal.         Behavior: Behavior normal.         Thought Content:  Thought content normal. Judgment: Judgment normal.         No results found for any visits on 02/25/20. HISTORICAL  PMH, PSH, FHX, SOCHX, ALLERGIES and MES were reviewed and updated today. Elijah Araujo MD  [facility]  02/25/20 3:09 PM    Portions of this note may have been populated using smart dictation software and may have \"sounds-like\" errors present. Pt was counseled on risks, benefits and alternatives of treatment options. All questions were asked and answered and the patient was agreeable with the treatment plan as outlined.

## 2020-02-25 NOTE — PATIENT INSTRUCTIONS
Patient seen yesterday in the emergency room for palpitations associated with diaphoresis and chest discomfort  EKG was relatively normal and unchanged from prior  No further episodes today  Had 2 L of normal saline in the hospital  Labs reviewed and relatively normal  No indication for why she is having these symptoms. I would recommend we check a Holter monitor and have her be evaluated by cardiology  I also recommended today that we increase her lisinopril from 10-20. She would like to wait a few weeks to see if her pressures come down and perhaps this is just an acute situation of stress. I think this is reasonable and I will see her back at our next visit. In the interim I would like for her to have her Holter monitor done and see 1 of the cardiologist to whom I have referred her    Encounter time today was 30 minutes and more than 50% of this encounter was spent in counseling face-to-face regarding Diagnosis, Patient Education, Medication Management, Compliance and Impressions.

## 2020-02-27 NOTE — PROGRESS NOTES
Shaival J. Candance Corning, MD [facility]  [address]  [place], [address] [facility]    Office [phone]  Fax [phone]  Cell [phone]       Ирина Guo is a 48 y.o. female referred for evaluation of chest pain. Consult requested by Kacie Gomez MD      Diagnoses    Encounter Diagnoses     ICD-10-CM ICD-9-CM   1. Palpitations R00.2 785.1   2. Chest discomfort R07.89 786.59   3. Essential hypertension I10 401.9       Assessment/Recommendations:    Palpitations w/ chest discomfort. Suspect PACs/ PVCs, although short bursts of SVT possible. Trigger appears to be \"stress. \" She has no exertional sxs. Exam and recent EKG normal. Echo July 2019 normal.   - Will evaluate further with 30 day event recorder  - reviewed stress management techniques (deepbreathing, mindfulness)    HTN, on lisinopril. Normotensive at home    Phone follow up after reviewing tests       Subjective:    Ирина Guo reports hx of high blood pressure and family hx of HTN but no cardiac hx otherwise. She reports worsening pattern of feeling as \"if heart was stopping. \" She reports increasing pattern of feeling uneasy, tunnel vision, heart palpitations, nausea. She reports 2 episodes w/in the past week, lasting ~15 minutes. She reports no exertional sxs w/ walking - 3-5 miles. Patient denies any exertional chest pain, dyspnea, syncope, orthopnea, edema or paroxysmal nocturnal dyspnea. She reports drinking 1-2 cups pd, but has given up coffee for lent. She does not drink alcohol. She reports a lot of stress in her life. She reports good diet, and improving sleep quality. In 2010, she experienced chest discomfort for which she was seen. Stress testing apparently nl. Of note, she is an advocate for parents. Cardiac risk factors   HTN yes  DM  no  Smoking no  Family hx of CAD yes, Mother/Sister HTN      Cardiac testing  No specialty comments available.       Past Medical History:   Diagnosis Date    Anemia 10/31/2011    Anemia NEC     Dr. Jennifer Kaur Marital status:      Spouse name: Not on file    Number of children: Not on file    Years of education: Not on file    Highest education level: Not on file   Tobacco Use    Smoking status: Never Smoker    Smokeless tobacco: Never Used   Substance and Sexual Activity    Alcohol use: No    Drug use: No       Current Outpatient Medications   Medication Sig Dispense Refill    cyanocobalamin (VITAMIN B-12) 100 mcg tablet Take 100 mcg by mouth daily.  ergocalciferol (VITAMIN D2) 1,250 mcg (50,000 unit) capsule Take 50,000 Units by mouth.  omega-3 acid ethyl esters (LOVAZA) 1 gram capsule Take 2 g by mouth two (2) times a day.  lisinopril (PRINIVIL, ZESTRIL) 10 mg tablet TAKE 1 TABLET BY MOUTH EVERY DAY 90 Tab 0    multivitamin (ONE A DAY) tablet Take 1 Tab by mouth daily. Allergies   Allergen Reactions    Lactose Diarrhea          Review of Symptoms:  Constitutional: Negative for fever, chills, malaise/fatigue and diaphoresis. Respiratory: Negative for cough, hemoptysis, sputum production, and wheezing. Cardiovascular: Negative for chest pain, orthopnea, claudication, leg swelling and PND. +chest discomfort, palpitations  Gastrointestinal: Negative for heartburn, nausea, vomiting, blood in stool and melena. Genitourinary: Negative for dysuria and flank pain. Musculoskeletal: Negative for joint pain and back pain. Skin: Negative for rash. Neurological: Negative for focal weakness, seizures, loss of consciousness, weakness and headaches. Endo/Heme/Allergies: Does not bruise/bleed easily. Psychiatric/Behavioral: Negative for memory loss.  The patient does not have insomnia. +stress      Physical Exam  Visit Vitals  /86 (BP 1 Location: Left arm, BP Patient Position: Sitting)   Pulse 76   Ht 5' 6\" (1.676 m)   Wt 159 lb (72.1 kg)   SpO2 96%   BMI 25.66 kg/m²     Wt Readings from Last 3 Encounters:   03/02/20 155 lb (70.3 kg)   03/02/20 159 lb (72.1 kg)   02/25/20 158 lb (71.7 kg)     General - WDWN  HEENT: Eyes without jaundice, Hearing intact  Neck - JVP normal, thyroid nl  Cardiac - normal S1,S2, no murmurs, rubs or gallops. No clicks  Vascular - carotids without bruits, radials, femorals and pedal pulses equal bilateral  Lungs - clear to auscultation bilaterals, no rales ,wheezing or rhonchi  Abd - soft nontender, no HSM, no abd bruits  Extremities - no edema  Neuro - nonfocal, normal gait  Psych - mood and affect normal    Labs:      Cardiographics  EKG 2/24/20 - sinus, normal to my review        Written by Elodia Jeans. Claudia Ashley, as dictated by Messi Bruner M.D.      Messi Bruner MD

## 2020-03-02 ENCOUNTER — TELEPHONE (OUTPATIENT)
Dept: CARDIOLOGY CLINIC | Age: 55
End: 2020-03-02

## 2020-03-02 ENCOUNTER — CLINICAL SUPPORT (OUTPATIENT)
Dept: CARDIOLOGY CLINIC | Age: 55
End: 2020-03-02

## 2020-03-02 ENCOUNTER — OFFICE VISIT (OUTPATIENT)
Dept: CARDIOLOGY CLINIC | Age: 55
End: 2020-03-02

## 2020-03-02 VITALS
HEIGHT: 66 IN | OXYGEN SATURATION: 96 % | SYSTOLIC BLOOD PRESSURE: 130 MMHG | HEART RATE: 76 BPM | WEIGHT: 159 LBS | DIASTOLIC BLOOD PRESSURE: 86 MMHG | BODY MASS INDEX: 25.55 KG/M2

## 2020-03-02 VITALS — BODY MASS INDEX: 25.02 KG/M2 | WEIGHT: 155 LBS

## 2020-03-02 DIAGNOSIS — R00.2 PALPITATIONS: Primary | ICD-10-CM

## 2020-03-02 DIAGNOSIS — R00.2 PALPITATION: Primary | ICD-10-CM

## 2020-03-02 DIAGNOSIS — R07.89 CHEST DISCOMFORT: ICD-10-CM

## 2020-03-02 DIAGNOSIS — I10 ESSENTIAL HYPERTENSION: ICD-10-CM

## 2020-03-02 RX ORDER — OMEGA-3-ACID ETHYL ESTERS 1 G/1
1 CAPSULE, LIQUID FILLED ORAL DAILY
COMMUNITY
End: 2020-10-28

## 2020-03-02 RX ORDER — UREA 10 %
100 LOTION (ML) TOPICAL DAILY
COMMUNITY

## 2020-03-02 RX ORDER — ERGOCALCIFEROL 1.25 MG/1
50000 CAPSULE ORAL
COMMUNITY

## 2020-03-02 NOTE — LETTER
3/2/20 Patient: Steph Garza YOB: 1965 Date of Visit: 3/2/2020 Delores Rodriguez MD 
[address] [place] [address] [address] [place] [id number] VIA In Basket Dear Delores Rodriguez MD, Thank you for referring Ms. Cheri Braun to [facility] for evaluation. My notes for this consultation are attached. If you have questions, please do not hesitate to call me. I look forward to following your patient along with you. Sincerely, Sree Manuel MD

## 2020-03-02 NOTE — PROGRESS NOTES
Worthy Pain is a 48 y.o. female    Chief Complaint   Patient presents with    Chest Pain (Angina)       Chest pain no  SOB no  Dizziness no  Swelling no  Recent hospital visit no  Refills no  Visit Vitals  /86 (BP 1 Location: Left arm, BP Patient Position: Sitting)   Pulse 76   Ht 5' 6\" (1.676 m)   Wt 159 lb (72.1 kg)   SpO2 96%   BMI 25.66 kg/m²

## 2020-03-02 NOTE — TELEPHONE ENCOUNTER
----- Message from Scooby Morales MD sent at 3/2/2020  1:05 PM EST -----  Regardin day loop recorder  Dx palpitations    Thx!

## 2020-03-03 ENCOUNTER — HOSPITAL ENCOUNTER (OUTPATIENT)
Dept: NON INVASIVE DIAGNOSTICS | Age: 55
Discharge: HOME | End: 2020-03-03
Attending: FAMILY MEDICINE

## 2020-03-03 DIAGNOSIS — I10 ESSENTIAL HYPERTENSION: ICD-10-CM

## 2020-03-03 DIAGNOSIS — R00.2 PALPITATIONS: ICD-10-CM

## 2020-03-03 DIAGNOSIS — R61 DIAPHORESIS: ICD-10-CM

## 2020-03-04 ENCOUNTER — TELEPHONE (OUTPATIENT)
Dept: CARDIOLOGY CLINIC | Age: 55
End: 2020-03-04

## 2020-03-04 NOTE — TELEPHONE ENCOUNTER
Martina Mcfarland with EKG @ [address] [facility] to speak with [facility] in regards to a holter monitor.  Please advise      PZGAN:[phone]

## 2020-03-04 NOTE — TELEPHONE ENCOUNTER
ordered 24 hour holter and  ordered loop.  Patient to have loop mailed to her and 24 hour cancelled

## 2020-03-22 DIAGNOSIS — I10 ESSENTIAL HYPERTENSION: ICD-10-CM

## 2020-03-23 ENCOUNTER — DOCUMENTATION ONLY (OUTPATIENT)
Dept: CARDIOLOGY CLINIC | Age: 55
End: 2020-03-23

## 2020-03-23 RX ORDER — LISINOPRIL 10 MG/1
TABLET ORAL
Qty: 90 TAB | Refills: 0 | Status: SHIPPED | OUTPATIENT
Start: 2020-03-23 | End: 2020-07-01

## 2020-03-23 NOTE — PROGRESS NOTES
Received critical loop alert from the weekend      3/21/20   1:19pm   Pt Trigger  Lightheaded, Flutter or Skipped Beats, Rapid or Fast HB   Sinus Rhythm w/Run of VT (12 sec) rates up to 166 bpm    **3/21/20  1:59pm Valarie paged Dr Jared Anthony, NP

## 2020-03-24 ENCOUNTER — TELEPHONE (OUTPATIENT)
Dept: CARDIOLOGY CLINIC | Age: 55
End: 2020-03-24

## 2020-03-24 RX ORDER — METOPROLOL SUCCINATE 50 MG/1
50 TABLET, EXTENDED RELEASE ORAL
Qty: 30 TAB | Refills: 3 | Status: SHIPPED | OUTPATIENT
Start: 2020-03-24 | End: 2020-07-14

## 2020-03-24 NOTE — TELEPHONE ENCOUNTER
Reviewed patient triggered event - looks like AF to me, rate 160s, similar to previous sx    Echo July 2019 nl  Recent TSH, labs normal  No exertional sx  [id number] score=1    Will start Toprol xl 50 mg qhs, move lisinopril to morning dosing    Monitor HR/BP    Schedule video visit thru [address] (still needs to activate) or Doxy. me in 2-3 weeks

## 2020-04-14 ENCOUNTER — VIRTUAL VISIT (OUTPATIENT)
Dept: FAMILY MEDICINE CLINIC | Age: 55
End: 2020-04-14

## 2020-04-14 VITALS
WEIGHT: 158.7 LBS | DIASTOLIC BLOOD PRESSURE: 88 MMHG | HEART RATE: 88 BPM | HEIGHT: 66 IN | BODY MASS INDEX: 25.51 KG/M2 | SYSTOLIC BLOOD PRESSURE: 127 MMHG

## 2020-04-14 DIAGNOSIS — I48.0 PAF (PAROXYSMAL ATRIAL FIBRILLATION) (HCC): ICD-10-CM

## 2020-04-14 DIAGNOSIS — I10 ESSENTIAL HYPERTENSION: Primary | ICD-10-CM

## 2020-04-14 DIAGNOSIS — R00.2 PALPITATIONS: ICD-10-CM

## 2020-04-14 NOTE — PROGRESS NOTES
Gela Blake is a 48 y.o. female who was seen by synchronous (real-time) audio-video technology on 4/14/2020. Assessment & Plan:   Diagnoses and all orders for this visit:    1. Essential hypertension    2. PAF (paroxysmal atrial fibrillation) (HCC)    3. Palpitations      Patient Instructions   Appears palpitations were likely d/t pAF  Pt following with cardiology  Recommend patient f/u with cardiology in 2 days as scheduled  C/w Metoprolol xl for now  C/w lisinopril--doesn't seem like BP is at goal, consider increasing lisinopril 10 to 20 for better control but defer to discussion with cardiology in 2 days for coordinating care    Continue to eat a healthy diet and exercise with a goal of maintaining a healthy weight and modifying cardiovascular risk factors      CPT Codes 79609-42526 for Established Patients may apply to this Telehealth Visit        Subjective:   Gela Blake was seen for Follow-up and Hypertension    HTN: patient reports stable on medications. No chest pain, sob, headache, blurred vision, leg swelling, diaphoresis, falls. Compliant with medications as prescribed. is checking blood pressures at home and reports range is 120s-160s/80s-100s. No significant hyper or hypotensive episodes that the patient reports today. Just now 127/88 with hr 80  Yesterday 152/97  Day before 150/99  Another day 167/100 (this was very atypical for her)  Found to have AF on holter, following with cardiology, on metoprolol, reports no pulse readings above 100 on checking bp and not symptomatic with palpitations  Atrial Fibrillation CHADSVASC2 Score Stroke Risk:   48 y.o. <65        + 0    female Female +1   CHF HX: No    + 0   HTN HX: Yes    +1   Stroke/TIA/Thromboembolism No    +0   Vascular Disease HX: No    + 0   Diabetes Mellitus No    + 0   CHADSVASC 2 Score 2      Annual Stroke Risk 2.2%- moderate-high        No n/v/d/c/f/ch/cp or sob, no headache or vision changes, no falls.       Prior to Admission medications    Medication Sig Start Date End Date Taking? Authorizing Provider   metoprolol succinate (TOPROL-XL) 50 mg XL tablet Take 1 Tab by mouth nightly. Indications: parxoysmal AF 3/24/20   Florian Chauhan MD   lisinopriL (PRINIVIL, ZESTRIL) 10 mg tablet TAKE 1 TABLET BY MOUTH EVERY DAY 3/23/20   Randy Lara MD   cyanocobalamin (VITAMIN B-12) 100 mcg tablet Take 100 mcg by mouth daily. Provider, Historical   ergocalciferol (VITAMIN D2) 1,250 mcg (50,000 unit) capsule Take 50,000 Units by mouth. Provider, Historical   omega-3 acid ethyl esters (LOVAZA) 1 gram capsule Take 2 g by mouth two (2) times a day. Provider, Historical   multivitamin (ONE A DAY) tablet Take 1 Tab by mouth daily. Provider, Historical     Allergies   Allergen Reactions    Lactose Diarrhea       Patient Active Problem List   Diagnosis Code    Anemia NEC     Disturbance of skin sensation R20.9    Migraine G43.909    Palpitations R00.2    Essential hypertension I10    [facility] I48.0     Past Medical History:   Diagnosis Date    Anemia 10/31/2011    Anemia NEC     Dr. Rose Mary Grewal     Past Surgical History:   Procedure Laterality Date    ENDOSCOPY, COLON, DIAGNOSTIC  10/07    HX  SECTION      HX TONSILLECTOMY       Family History   Problem Relation Age of Onset   [address] [facility] Hypertension Mother     Hypertension Sister     Other Maternal Grandmother         anyeurism     Social History     Tobacco Use    Smoking status: Never Smoker    Smokeless tobacco: Never Used   Substance Use Topics    Alcohol use: No          ROS  A 12 point review of systems was negative except as noted here or in the HPI.       Objective:     Visit Vitals  /88   Pulse 88   Ht 5' 6\" (1.676 m)   Wt 158 lb 11.2 oz (72 kg)   BMI 25.61 kg/m²       General: alert, cooperative, no distress   Mental  status: mental status: alert, oriented to person, place, and time, normal mood, behavior, speech, dress, motor activity, and thought processes   Resp: resp: normal effort and no respiratory distress   Neuro: neuro: no gross deficits   Skin: skin: no discoloration or lesions of concern on visible areas     Due to this being a TeleHealth evaluation, many elements of the physical examination are unable to be assessed. We discussed the expected course, resolution and complications of the diagnosis(es) in detail. Medication risks, benefits, costs, interactions, and alternatives were discussed as indicated. I advised her to contact the office if her condition worsens, changes or fails to improve as anticipated. She expressed understanding with the diagnosis(es) and plan. Pursuant to the emergency declaration under the [facility], [facility] waiver authority and the [employer] and [employer] General Act, this Virtual  Visit was conducted, with patient's consent, to reduce the patient's risk of exposure to COVID-19 and provide continuity of care for an established patient. Services were provided through a video synchronous discussion virtually to substitute for in-person clinic visit.     Meena Serra MD

## 2020-04-14 NOTE — PATIENT INSTRUCTIONS
Appears palpitations were likely d/t pAF Pt following with cardiology Recommend patient f/u with cardiology in 2 days as scheduled C/w Metoprolol xl for now C/w lisinopril--doesn't seem like BP is at goal, consider increasing lisinopril 10 to 20 for better control but defer to discussion with cardiology in 2 days for coordinating care Continue to eat a healthy diet and exercise with a goal of maintaining a healthy weight and modifying cardiovascular risk factors

## 2020-04-16 ENCOUNTER — VIRTUAL VISIT (OUTPATIENT)
Dept: CARDIOLOGY CLINIC | Age: 55
End: 2020-04-16

## 2020-04-16 DIAGNOSIS — I47.1 SVT (SUPRAVENTRICULAR TACHYCARDIA) (HCC): ICD-10-CM

## 2020-04-16 DIAGNOSIS — I10 ESSENTIAL HYPERTENSION: Primary | ICD-10-CM

## 2020-04-16 NOTE — PROGRESS NOTES
VIRTUAL VISIT DOCUMENTATION     Pursuant to the emergency declaration under the [facility] [place], [facility] waiver authority and the [employer] Act, this Virtual  Visit was conducted, with patient's consent, to reduce the patient's risk of exposure to COVID-19 and provide continuity of care for an established patient. Services were provided through a video synchronous discussion virtually to substitute for in-person clinic visit. CHIEF COMPLAINT      Guero Velazquez is a 48 y.o. female who was seen by synchronous (real-time) audio-video technology on 4/16/2020. Patient is being seen today for followup of SVT     ASSESSMENT        ICD-10-CM ICD-9-CM    1. Essential hypertension I10 401.9    2. SVT (supraventricular tachycardia) (HCC) I47.1 427.89         PLAN     Paroxysmal SVT. Documented by event recorder. Likely AVNRT. Initially concerned about AF but reviewed all episodes - SVT. No symptomatic recurrence after starting metoprolol. Discussed role of ablation if frequent recurrence. Discussed vagal maneuvers  - continue Toprol xl 50/d    HTN. Trend improving. Watch salt   - continue combination Rx - toprol and lisinopril. Would increase metoprolol to 100/d if sys BP consistently > 140  - echo July 2019 nl without LVH    RTC 3 months  We discussed the expected course, resolution and complications of the diagnosis(es) in detail. Medication risks, benefits, costs, interactions, and alternatives were discussed as indicated. I advised her to contact the office if her condition worsens, changes or fails to improve as anticipated.  She expressed understanding with the diagnosis(es) and plan    HISTORY OF PRESENTING ILLNESS      Guero Velazquez is a 48 y.o. female   Dx with SVT based on event recorder - 3 events noted with +  No recurrence after starting metoprolol a few weeks ago  BP high 544+ systolic range but trending down now with addition of toprol, now 127/88, HR 80s    Patient denies any exertional chest pain, dyspnea, , syncope, orthopnea, edema or paroxysmal nocturnal dyspnea. Has appt with counselor next week to evaluate anxiety which certainly can fuel tachycardia. ACTIVE PROBLEM LIST     Patient Active Problem List    Diagnosis Date Noted    SVT [facility]supraventricular tachycardia) ([facility] [address] 2020    Palpitations 2020    Essential hypertension 2020    Migraine 2013    Disturbance of skin sensation 2012    Anemia NEC            PAST MEDICAL HISTORY     Past Medical History:   Diagnosis Date    Anemia 10/31/2011    Anemia NEC     Dr. Janel Blevins     Past Surgical History:   Procedure Laterality Date    ENDOSCOPY, COLON, DIAGNOSTIC  10/07    HX  SECTION      HX TONSILLECTOMY            ALLERGIES     Allergies   Allergen Reactions    Lactose Diarrhea          FAMILY HISTORY     Family History   Problem Relation Age of Onset    Hypertension Mother     Hypertension Sister     Other Maternal Grandmother         anyeurism    negative for cardiac disease       SOCIAL HISTORY     Social History     Socioeconomic History    Marital status:      Spouse name: Not on file    Number of children: Not on file    Years of education: Not on file    Highest education level: Not on file   Tobacco Use    Smoking status: Never Smoker    Smokeless tobacco: Never Used   Substance and Sexual Activity    Alcohol use: No    Drug use: No         MEDICATIONS     Current Outpatient Medications   Medication Sig    metoprolol succinate (TOPROL-XL) 50 mg XL tablet Take 1 Tab by mouth nightly. Indications: parxoysmal AF    lisinopriL (PRINIVIL, ZESTRIL) 10 mg tablet TAKE 1 TABLET BY MOUTH EVERY DAY    cyanocobalamin (VITAMIN B-12) 100 mcg tablet Take 100 mcg by mouth daily.     ergocalciferol (VITAMIN D2) 1,250 mcg (50,000 unit) capsule Take 50,000 Units by mouth.  omega-3 acid ethyl esters (LOVAZA) 1 gram capsule Take 1 g by mouth daily.  multivitamin (ONE A DAY) tablet Take 1 Tab by mouth daily. No current facility-administered medications for this visit. I have reviewed the nurses notes, vitals, problem list, allergy list, medical history, family, social history and medications. REVIEW OF SYMPTOMS     Constitutional: Negative for fever, chills, malaise/fatigue and diaphoresis. Respiratory: Negative for cough, hemoptysis, sputum production, shortness of breath and wheezing. Cardiovascular: Negative for chest pain, , orthopnea, claudication, leg swelling and PND. Gastrointestinal: Negative for heartburn, nausea, vomiting, blood in stool and melena. Genitourinary: Negative for dysuria and flank pain. Musculoskeletal: Negative for joint pain and back pain. Skin: Negative for rash. Neurological: Negative for focal weakness, seizures, loss of consciousness, weakness and headaches. Endo/Heme/Allergies: Negative for abnormal bleeding. Psychiatric/Behavioral: Negative for memory loss. PHYSICAL EXAMINATION      Due to this being a TeleHealth evaluation, many elements of the physical examination are unable to be assessed. General: Well developed, in no acute distress, cooperative and alert  HEENT: Pupils equal/round. No marked JVD visible on video. Respiratory: No audible wheezing, no signs of respiratory distress, lips non cyanotic  Extremities:  No edema  Neuro: A&Ox3, speech clear, no facial droop, answering questions appropriately  Skin: Skin color is normal. No rashes or lesions. Non diaphoretic on visible skin during exam       DIAGNOSTIC DATA      No specialty comments available.        LABORATORY DATA      Lab Results   Component Value Date/Time    WBC 3.0 (L) 02/24/2020 12:59 PM    HGB 12.1 02/24/2020 12:59 PM    HCT 37.4 02/24/2020 12:59 PM    PLATELET 806 59/55/3999 12:59 PM    MCV 98.9 02/24/2020 12:59 PM Lab Results   Component Value Date/Time    Sodium 140 02/24/2020 12:59 PM    Potassium 3.8 02/24/2020 12:59 PM    Chloride 107 02/24/2020 12:59 PM    CO2 31 02/24/2020 12:59 PM    Anion gap 2 (L) 02/24/2020 12:59 PM    Glucose 88 02/24/2020 12:59 PM    BUN 15 02/24/2020 12:59 PM    Creatinine 0.78 02/24/2020 12:59 PM    BUN/Creatinine ratio 19 02/24/2020 12:59 PM    GFR est AA >60 02/24/2020 12:59 PM    GFR est non-AA >60 02/24/2020 12:59 PM    Calcium 9.2 02/24/2020 12:59 PM    Bilirubin, total 0.6 02/24/2020 12:59 PM    AST (SGOT) 26 02/24/2020 12:59 PM    Alk. phosphatase 89 02/24/2020 12:59 PM    Protein, total 7.3 02/24/2020 12:59 PM    Albumin 3.9 02/24/2020 12:59 PM    Globulin 3.4 02/24/2020 12:59 PM    A-G Ratio 1.1 02/24/2020 12:59 PM    ALT (SGPT) 37 02/24/2020 12:59 PM             FOLLOW-UP            Patient was made aware and verbalized understanding that an appointment will be scheduled for them for a virtual visit and/or office visit within the above time frame. Patient understanding his/her responsibility to call and change time/date if he/she so chooses. Thank you, Bianca Cunningham MD for allowing me to participate in the care of [facility]. Please do not hesitate to contact me for further questions/concerns. Greater than 20 minutes was spent in direct video patient care, planning and chart review. This visit was conducted using [employer] telemedicine services.        Tobin Matthew MD    [address]        [phone] / [phone] Fax       [facility] [facility] [address]  [place]  [phone] / [phone] Fax

## 2020-06-05 ENCOUNTER — OFFICE VISIT (OUTPATIENT)
Dept: FAMILY MEDICINE CLINIC | Age: 55
End: 2020-06-05

## 2020-06-05 VITALS
HEART RATE: 71 BPM | SYSTOLIC BLOOD PRESSURE: 124 MMHG | RESPIRATION RATE: 18 BRPM | OXYGEN SATURATION: 99 % | BODY MASS INDEX: 26.03 KG/M2 | HEIGHT: 66 IN | WEIGHT: 162 LBS | DIASTOLIC BLOOD PRESSURE: 74 MMHG | TEMPERATURE: 97.5 F

## 2020-06-05 DIAGNOSIS — I10 ESSENTIAL HYPERTENSION: Primary | ICD-10-CM

## 2020-06-05 DIAGNOSIS — Z11.1 SCREENING FOR TUBERCULOSIS: ICD-10-CM

## 2020-06-05 NOTE — PATIENT INSTRUCTIONS
ICD-10-CM ICD-9-CM    1. Essential hypertension I10 401.9    2.  Screening for tuberculosis Z11.1 V74.1      bp at goal, keep up the good work  Tb screening neg, pre employment paperwork filled out for patient, copy scanned to records

## 2020-06-05 NOTE — PROGRESS NOTES
Chief Complaint   Patient presents with    Documentation     PPD for work- may not need but needs signature

## 2020-06-05 NOTE — PROGRESS NOTES
Family Medicine Acute Visit Progress Note  Patient: Rafi Erp  1965, 50 y.o., female  Encounter Date: 6/5/2020    ASSESSMENT & PLAN    ICD-10-CM ICD-9-CM    1. Essential hypertension I10 401.9    2. Screening for tuberculosis Z11.1 V74.1        No orders of the defined types were placed in this encounter. Patient Instructions       ICD-10-CM ICD-9-CM    1. Essential hypertension I10 401.9    2. Screening for tuberculosis Z11.1 V74.1      bp at goal, keep up the good work  Tb screening neg, pre employment paperwork filled out for patient, copy scanned to records        [facility]  Chief Complaint   Patient presents with    Documentation     PPD for work- may not need but needs signature        Emi Willoughby is a 50 y.o. female presenting today for tb screening for work. 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 1. Have you experienced any of the following symptoms in the past year?   a.) A productive cough for more than 3 weeks? no   b.) Hemoptysis (coughing up blood)? no   c.) Unexplained weight loss?no   d.) Fever, Chills, or night sweats for no known reason? no   e.) Persistent shortness of breath? no   f.) Unexplained fatigue? no   g.) Chest Pain? no  2. Have you had contact with anyone with active tuberculosis disease in the past year? no  3. Do you have a medical condition, or are you taking medications, which suppress  your immune system? no    HTN: patient reports stable on medications. No chest pain, sob, headache, blurred vision, leg swelling, diaphoresis, falls. Compliant with medications as prescribed. IS checking blood pressures at home and reports range is 120/60-80. No significant hyper or hypotensive episodes that the patient reports today. Review of Systems  A 12 point review of systems was negative except as noted here or in the HPI.     OBJECTIVE  Visit Vitals  /74   Pulse 71   Temp 97.5 °F (36.4 °C) (Temporal)   Resp 18   Ht 5' 6\" (1.676 m)   Wt 162 lb (73.5 kg)   SpO2 99%   BMI 26.15 kg/m²       Physical Exam  Vitals signs and nursing note reviewed. Constitutional:       General: She is not in acute distress. Appearance: Normal appearance. She is well-developed and normal weight. She is not ill-appearing, toxic-appearing or diaphoretic. HENT:      Head: Normocephalic and atraumatic. Right Ear: External ear normal.      Left Ear: External ear normal.      Nose: Nose normal. No congestion. Mouth/Throat:      Mouth: Mucous membranes are moist.      Pharynx: Oropharynx is clear. No oropharyngeal exudate or posterior oropharyngeal erythema. Eyes:      General: No scleral icterus. Right eye: No discharge. Left eye: No discharge. Extraocular Movements: Extraocular movements intact. Conjunctiva/sclera: Conjunctivae normal.      Pupils: Pupils are equal, round, and reactive to light. Neck:      Musculoskeletal: Normal range of motion and neck supple. Vascular: No carotid bruit. Cardiovascular:      Rate and Rhythm: Normal rate and regular rhythm. Heart sounds: Normal heart sounds. No murmur. No friction rub. No gallop. Pulmonary:      Effort: Pulmonary effort is normal. No respiratory distress. Breath sounds: Normal breath sounds. No stridor. No wheezing, rhonchi or rales. Abdominal:      General: Bowel sounds are normal. There is no distension. Palpations: Abdomen is soft. Tenderness: There is no abdominal tenderness. Musculoskeletal: Normal range of motion. General: No tenderness. Right lower leg: No edema. Left lower leg: No edema. Lymphadenopathy:      Cervical: No cervical adenopathy. Skin:     General: Skin is warm and dry. Capillary Refill: Capillary refill takes less than 2 seconds. Findings: No rash. Neurological:      General: No focal deficit present.       Mental Status: She is alert and oriented to person, place, and time. Coordination: Coordination normal.      Gait: Gait normal.   Psychiatric:         Mood and Affect: Mood normal.         Behavior: Behavior normal.         Thought Content: Thought content normal.         Judgment: Judgment normal.         No results found for any visits on 06/05/20. HISTORICAL  PMH, PSH, FHX, SOCHX, ALLERGIES and MEDS were reviewed and updated today. Radha Aaron MD  [facility]  06/05/20 10:36 AM    Portions of this note may have been populated using smart dictation software and may have \"sounds-like\" errors present. Pt was counseled on risks, benefits and alternatives of treatment options. All questions were asked and answered and the patient was agreeable with the treatment plan as outlined.

## 2020-07-01 DIAGNOSIS — I10 ESSENTIAL HYPERTENSION: ICD-10-CM

## 2020-07-01 RX ORDER — LISINOPRIL 10 MG/1
TABLET ORAL
Qty: 90 TAB | Refills: 0 | Status: SHIPPED | OUTPATIENT
Start: 2020-07-01 | End: 2020-09-28

## 2020-07-28 ENCOUNTER — OFFICE VISIT (OUTPATIENT)
Dept: CARDIOLOGY CLINIC | Age: 55
End: 2020-07-28

## 2020-07-28 VITALS
DIASTOLIC BLOOD PRESSURE: 90 MMHG | WEIGHT: 165.5 LBS | SYSTOLIC BLOOD PRESSURE: 146 MMHG | BODY MASS INDEX: 26.6 KG/M2 | HEIGHT: 66 IN | OXYGEN SATURATION: 99 % | HEART RATE: 69 BPM

## 2020-07-28 DIAGNOSIS — I10 ESSENTIAL HYPERTENSION: ICD-10-CM

## 2020-07-28 DIAGNOSIS — I47.1 SVT (SUPRAVENTRICULAR TACHYCARDIA) (HCC): Primary | ICD-10-CM

## 2020-07-28 RX ORDER — ASCORBIC ACID 500 MG
TABLET ORAL
COMMUNITY

## 2020-07-28 RX ORDER — METOPROLOL SUCCINATE 50 MG/1
75 TABLET, EXTENDED RELEASE ORAL DAILY
Qty: 135 TAB | Refills: 0
Start: 2020-07-28 | End: 2020-09-30 | Stop reason: SDUPTHER

## 2020-07-28 NOTE — PROGRESS NOTES
Yang Calles, [facility]  [address]  [place], [address] [facility]    Office [phone]  Fax [phone]         Juan Castro is a 50 y.o. female here for f/u of SVT. Last seen 4/2020 by VV by Dr. Supriya Nascimento. Diagnoses    Encounter Diagnoses     ICD-10-CM ICD-9-CM   1. SVT (supraventricular tachycardia) ([facility])  I47.1 427.89   2. Essential hypertension  I10 401.9       Assessment/Recommendations:  Paroxysmal SVT. Documented by event recorder. Likely AVNRT. Initially concerned about AF but Dr. Supriya Nascimento reviewed all episodes - SVT. Infrequent symptomatic recurrence since starting metoprolol. Discussed role of ablation if frequency worsens. Pt using vagal maneuvers with good success. - continue Toprol XL - increase to 75mg/d     HTN. Mildly elevated  - continue combination Rx - toprol and lisinopril. Increasing toprol per above  - echo July 2019 nl without LVH  - advised to watch salt     F/u in 3 months or PRN    Subjective:  Juan Castro is a 50 y.o. female   Dx with SVT based on event recorder.   Pt feels she is doing well and overall without complaint. Has infrequent palpitations which resolve with vagal maneuvers. 3 episodes since last visit in April. Patient denies any chest pain, dyspnea, syncope, orthopnea, edema or paroxysmal nocturnal dyspnea. Has gained some weight during COVID pandemic; now working on weight loss.       Notes diastolic pressures high recently. Could not give actual numbers. Cardiac testing    07/24/19   ECHO ADULT COMPLETE 07/24/2019 7/24/2019    Narrative · Left Ventricle: Normal cavity size, wall thickness and systolic function   (ejection fraction normal). Estimated left ventricular ejection fraction   is 61 - 65%. No regional wall motion abnormality noted. Mild (grade 1)   left ventricular diastolic dysfunction. · Mitral Valve: Trace mitral valve regurgitation.   · Tricuspid Valve: Mild tricuspid valve regurgitation is present. Signed by: Jesus Hernandez MD     Past Medical History:   Diagnosis Date    Anemia 10/31/2011    Anemia NEC     Dr. Swetha Hemphill Marital status:      Spouse name: Not on file    Number of children: Not on file    Years of education: Not on file    Highest education level: Not on file   Tobacco Use    Smoking status: Never Smoker    Smokeless tobacco: Never Used   Substance and Sexual Activity    Alcohol use: No    Drug use: No       Current Outpatient Medications   Medication Sig Dispense Refill    ascorbic acid, vitamin C, (VITAMIN C) 500 mg tablet Take  by mouth.  metoprolol succinate (TOPROL-XL) 50 mg XL tablet Take 1.5 Tabs by mouth daily. 135 Tab 0    lisinopriL (PRINIVIL, ZESTRIL) 10 mg tablet TAKE 1 TABLET BY MOUTH EVERY DAY 90 Tab 0    cyanocobalamin (VITAMIN B-12) 100 mcg tablet Take 100 mcg by mouth daily.  ergocalciferol (VITAMIN D2) 1,250 mcg (50,000 unit) capsule Take 50,000 Units by mouth.  omega-3 acid ethyl esters (LOVAZA) 1 gram capsule Take 1 g by mouth daily.  multivitamin (ONE A DAY) tablet Take 1 Tab by mouth daily. Allergies   Allergen Reactions    Lactose Diarrhea          Review of Symptoms:  (Positive findings above)  Constitutional: Negative for fever, chills, and diaphoresis. Respiratory: Negative for cough, hemoptysis, sputum production, shortness of breath and wheezing. Cardiovascular: Negative for chest pain, orthopnea, leg swelling and PND. Gastrointestinal: Negative for heartburn, nausea, vomiting, blood in stool and melena. Genitourinary: Negative for dysuria and flank pain. Neurological: Negative for focal weakness, seizures, loss of consciousness  Endo/Heme/Allergies: Negative for abnormal bleeding. Psychiatric/Behavioral: Negative for memory loss.      Physical Exam  Visit Vitals  /90 (BP 1 Location: Left arm, BP Patient Position: Sitting)   Pulse 69 Ht 5' 6\" (1.676 m)   Wt 165 lb 8 oz (75.1 kg)   SpO2 99%   BMI 26.71 kg/m²     Repeat /90    Wt Readings from Last 3 Encounters:   07/28/20 165 lb 8 oz (75.1 kg)   06/05/20 162 lb (73.5 kg)   04/14/20 158 lb 11.2 oz (72 kg)     General - well developed well nourished  Neck - JVP normal, neck supple  Cardiac - normal S1, S2, RRR no murmurs, rubs or gallops. No clicks  Vascular - carotids without bruits, radials pulses equal bilateral  Lungs - clear to auscultation bilaterals, no rales, wheezing or rhonchi  Abd - soft nontender, non-distended, +BS  Extremities - no edema, warm, well perfused  Neuro - nonfocal  Psych - normal mood and affect    Labs:  Lab Results   Component Value Date/Time    Sodium 140 02/24/2020 12:59 PM    Potassium 3.8 02/24/2020 12:59 PM    Chloride 107 02/24/2020 12:59 PM    CO2 31 02/24/2020 12:59 PM    Anion gap 2 (L) 02/24/2020 12:59 PM    Glucose 88 02/24/2020 12:59 PM    BUN 15 02/24/2020 12:59 PM    Creatinine 0.78 02/24/2020 12:59 PM    BUN/Creatinine ratio 19 02/24/2020 12:59 PM    GFR est AA >60 02/24/2020 12:59 PM    GFR est non-AA >60 02/24/2020 12:59 PM    Calcium 9.2 02/24/2020 12:59 PM    Bilirubin, total 0.6 02/24/2020 12:59 PM    Alk.  phosphatase 89 02/24/2020 12:59 PM    Protein, total 7.3 02/24/2020 12:59 PM    Albumin 3.9 02/24/2020 12:59 PM    Globulin 3.4 02/24/2020 12:59 PM    A-G Ratio 1.1 02/24/2020 12:59 PM    ALT (SGPT) 37 02/24/2020 12:59 PM    AST (SGOT) 26 02/24/2020 12:59 PM     Lab Results   Component Value Date/Time    WBC 3.0 (L) 02/24/2020 12:59 PM    HGB 12.1 02/24/2020 12:59 PM    HCT 37.4 02/24/2020 12:59 PM    PLATELET 944 89/35/0026 12:59 PM    MCV 98.9 02/24/2020 12:59 PM     No results found for: HBA1C, HGBE8, FTY6BBOY, MMV6DTOV, JYB8LOBY  Lab Results   Component Value Date/Time    Cholesterol, total 166 08/22/2018 11:13 AM    HDL Cholesterol 68 08/22/2018 11:13 AM    LDL, calculated 90 08/22/2018 11:13 AM    VLDL, calculated 8 08/22/2018 11:13 AM Triglyceride 41 08/22/2018 11:13 AM     Lab Results   Component Value Date/Time    TSH 1.04 02/24/2020 01:24 PM    T4, Free 1.13 12/20/2012 03:31 PM     Cardiographics  EKG 2/24/20 - sinus, normal       Kaleigh Strickland NP

## 2020-07-28 NOTE — PROGRESS NOTES
Laila Martinez is a 50 y.o. female    Visit Vitals  /90 (BP 1 Location: Left arm, BP Patient Position: Sitting)   Pulse 69   Ht 5' 6\" (1.676 m)   Wt 165 lb 8 oz (75.1 kg)   SpO2 99%   BMI 26.71 kg/m²       Chief Complaint   Patient presents with    Hypertension    Other     SVT    Follow-up     3MO       Chest pain no  SOB no  Dizziness no  Swelling no  Recent hospital visit no  Refills no

## 2020-07-28 NOTE — LETTER
7/30/20 Patient: Tolu Sharma YOB: 1965 Date of Visit: 7/28/2020 Danitza Anthony MD 
[address] [facility] [address] [place] [address] [id number] VIA In Basket Dear Danitza Anthony MD, Thank you for referring Ms. Cheri Braun to [facility] for evaluation. My notes for this consultation are attached. If you have questions, please do not hesitate to call me. I look forward to following your patient along with you.  
 
 
Sincerely, 
 
Pat Galvez NP 
 
 - - -

## 2020-07-28 NOTE — PATIENT INSTRUCTIONS
Increase Toprol XL to 75mg/d (1.5tabs)   Keep home BP log. Call prior to follow-up if BP routinely >130/80    Follow-up in 3 month or as needed.

## 2020-07-31 ENCOUNTER — TELEPHONE (OUTPATIENT)
Dept: CARDIOLOGY CLINIC | Age: 55
End: 2020-07-31

## 2020-07-31 NOTE — TELEPHONE ENCOUNTER
Pt states she's having problems with her breathing, BP(154/97 pulse 52) is up high but pulse is down. Not sure if it's coming from the medication.  Please advise        WILTON:[phone]

## 2020-07-31 NOTE — TELEPHONE ENCOUNTER
PTIDx2 stated when she takes a deep breath has sharp pain in her back. Denies CP or edema.     /97   HR 52      Advise    This nurse also suggested patient call PCP as well

## 2020-08-03 NOTE — TELEPHONE ENCOUNTER
Marcella Colindres stated SOB improved. Patient stated felt it was a pulled muscle that caused her lung pain. Reviewed with Alicia Edwards NP. Patient to continue to monitor BP ,keep a log and call back in 1 week with update.     Patient verbalized understanding

## 2020-08-24 LAB — MAMMOGRAPHY, EXTERNAL: NORMAL

## 2020-09-04 ENCOUNTER — DOCUMENTATION ONLY (OUTPATIENT)
Dept: FAMILY MEDICINE CLINIC | Age: 55
End: 2020-09-04

## 2020-09-26 DIAGNOSIS — I10 ESSENTIAL HYPERTENSION: ICD-10-CM

## 2020-09-28 RX ORDER — LISINOPRIL 10 MG/1
TABLET ORAL
Qty: 90 TAB | Refills: 0 | Status: SHIPPED | OUTPATIENT
Start: 2020-09-28 | End: 2020-12-23

## 2020-10-28 ENCOUNTER — OFFICE VISIT (OUTPATIENT)
Dept: CARDIOLOGY CLINIC | Age: 55
End: 2020-10-28
Payer: COMMERCIAL

## 2020-10-28 VITALS
DIASTOLIC BLOOD PRESSURE: 100 MMHG | HEART RATE: 84 BPM | BODY MASS INDEX: 27.61 KG/M2 | HEIGHT: 66 IN | WEIGHT: 171.8 LBS | SYSTOLIC BLOOD PRESSURE: 140 MMHG

## 2020-10-28 DIAGNOSIS — I47.1 SVT (SUPRAVENTRICULAR TACHYCARDIA) (HCC): ICD-10-CM

## 2020-10-28 DIAGNOSIS — I10 ESSENTIAL HYPERTENSION: Primary | ICD-10-CM

## 2020-10-28 PROCEDURE — 99214 OFFICE O/P EST MOD 30 MIN: CPT | Performed by: SPECIALIST

## 2020-10-28 NOTE — PROGRESS NOTES
Visit Vitals  BP (!) 140/100   Pulse 84   Ht 5' 6\" (1.676 m)   Wt 171 lb 12.8 oz (77.9 kg)   BMI 27.73 kg/m²

## 2020-10-28 NOTE — LETTER
10/29/20 Patient: Dylan Bridges YOB: 1965 Date of Visit: 10/28/2020 Patricia Soria MD 
[facility] [address] [place] [id number] VIA In Basket Dear Patricia Soria MD, Thank you for referring Ms. Cheri Braun to [facility] for evaluation. My notes for this consultation are attached. If you have questions, please do not hesitate to call me. I look forward to following your patient along with you. Sincerely, Nav Nelson MD

## 2020-10-28 NOTE — PROGRESS NOTES
Luc Abrams MD [facility]  [address],  [facility], [address] [facility]    Office [phone]  Fax [phone]  Cell [phone]       Kandace Boyer is a 50 y.o. female. Last seen by AMANDA Lea 3 months ago. Diagnoses    Encounter Diagnoses     ICD-10-CM ICD-9-CM   1. Essential hypertension  I10 401.9   2. SVT (supraventricular tachycardia) (HCC)  I47.1 427.89       Assessment/Recommendations:    Paroxysmal SVT. Documented by event recorder. Likely AVNRT. No significant occurrence in the past month after up titration of metoprolol   - Continue Toprol XL 75/d     HTN, suboptimal elevation on combination therapy. No LVH by Echo 2019. Suspect culprit is related to poor sleep quality and sodium excess through processed foods. She is motivated to try harder. We discussed adding a thiazide diuretic but she would like to hold off for now. - Continue combination Rx - Toprol and lisinopril. - Low sodium diet  - Continue home BP monitoring     RTC 3 months    Follow-up and Dispositions    · Return in about 3 months (around 1/28/2021). Subjective:    She reports no interval cardiac sxs. She notes that inconsistencies in her walking and exercise contribute to dyspnea. Patient denies any exertional chest pain, palpitations, syncope, orthopnea, edema or paroxysmal nocturnal dyspnea. She states she has recently started checking the labels of her food and has noticed the sodium content of her food. She follows a vegan diet and many of the processed vegan foods have a large amount of sodium. She notes she is planning to decrease her sodium intake. She admits an episode where she lost 4 lbs in one day, but regained the weight the next day. She reports at-home BP readings have been around 135/90s. She admits fatigue, but denies snoring. She admits waking intermittently throughout the night. She admits intermittent headaches. She denies increased stress. She uses her Apple Watch to practice deep breathing. She notes she would like to work on her diet before adding a diuretic. Cardiac Risk Factors  HTN yes  DM no  Smoking no  Family Hx of CAD yes, Mother/sister HTN    Cardiac testing  No specialty comments available. Past Medical History:   Diagnosis Date    Anemia 10/31/2011    Anemia NEC     Dr. Lakshmi Carmona Marital status:      Spouse name: Not on file    Number of children: Not on file    Years of education: Not on file    Highest education level: Not on file   Tobacco Use    Smoking status: Never Smoker    Smokeless tobacco: Never Used   Substance and Sexual Activity    Alcohol use: No    Drug use: No       Current Outpatient Medications   Medication Sig Dispense Refill    metoprolol succinate (TOPROL-XL) 50 mg XL tablet Take 1.5 Tabs by mouth daily. 135 Tab 3    lisinopriL (PRINIVIL, ZESTRIL) 10 mg tablet TAKE 1 TABLET BY MOUTH EVERY DAY 90 Tab 0    ascorbic acid, vitamin C, (VITAMIN C) 500 mg tablet Take  by mouth.  cyanocobalamin (VITAMIN B-12) 100 mcg tablet Take 100 mcg by mouth daily.  ergocalciferol (VITAMIN D2) 1,250 mcg (50,000 unit) capsule Take 50,000 Units by mouth.  multivitamin (ONE A DAY) tablet Take 1 Tab by mouth daily. Allergies   Allergen Reactions    Lactose Diarrhea          Review of Symptoms:  Constitutional: Negative for fever, chills, and diaphoresis. + fatigue  Respiratory: Negative for cough, hemoptysis, sputum production, and wheezing. Cardiovascular: Negative for chest pain, palpitations, orthopnea, claudication, leg swelling and PND. Gastrointestinal: Negative for heartburn, nausea, vomiting, blood in stool and melena. Genitourinary: Negative for dysuria and flank pain. Musculoskeletal: Negative for joint pain and back pain. Skin: Negative for rash.   Neurological: Negative for focal weakness, seizures, loss of consciousness, and weakness. + headaches  Endo/Heme/Allergies: Does not bruise/bleed easily. Psychiatric/Behavioral: Negative for memory loss. The patient does not have insomnia. Physical Exam  Visit Vitals  BP (!) 140/100   Pulse 84   Ht 5' 6\" (1.676 m)   Wt 171 lb 12.8 oz (77.9 kg)   BMI 27.73 kg/m²     Wt Readings from Last 3 Encounters:   10/28/20 171 lb 12.8 oz (77.9 kg)   07/28/20 165 lb 8 oz (75.1 kg)   06/05/20 162 lb (73.5 kg)     General - WDWN  HEENT: Eyes without jaundice, Hearing intact  Neck - JVP normal, thyroid nl  Cardiac - normal S1,S2, no murmurs, rubs or gallops. No clicks  Vascular - carotids without bruits, radials, femorals and pedal pulses equal bilateral  Lungs - clear to auscultation bilaterals, no rales ,wheezing or rhonchi  Abd - soft nontender, no HSM, no abd bruits  Extremities - no edema  Neuro - nonfocal, normal gait  Psych - mood and affect normal    Labs:      Cardiographics  EKG 2/24/20 - sinus, normal to my review      Written by Bee Webster, as dictated by Cathie Serrano M.D.      Cathie Serrano MD

## 2020-11-12 LAB — PAP SMEAR, EXTERNAL: NORMAL

## 2020-12-23 DIAGNOSIS — I10 ESSENTIAL HYPERTENSION: ICD-10-CM

## 2020-12-23 RX ORDER — LISINOPRIL 10 MG/1
TABLET ORAL
Qty: 90 TAB | Refills: 0 | Status: SHIPPED | OUTPATIENT
Start: 2020-12-23 | End: 2021-03-22

## 2021-02-10 ENCOUNTER — VIRTUAL VISIT (OUTPATIENT)
Dept: FAMILY MEDICINE CLINIC | Age: 56
End: 2021-02-10
Payer: COMMERCIAL

## 2021-02-10 DIAGNOSIS — R59.0 SUPRACLAVICULAR LYMPHADENOPATHY: Primary | ICD-10-CM

## 2021-02-10 PROCEDURE — 99213 OFFICE O/P EST LOW 20 MIN: CPT | Performed by: FAMILY MEDICINE

## 2021-02-10 NOTE — PROGRESS NOTES
Chief Complaint   Patient presents with    Mass     Right side collar bone, denies pain. 1. Have you been to the ER, urgent care clinic since your last visit? Hospitalized since your last visit? No    2. Have you seen or consulted any other health care providers outside of the [address] [place] [address] since your last visit? Include any pap smears or colon screening.  No

## 2021-02-10 NOTE — PROGRESS NOTES
Preston Farnsworth is a 50 y.o. female who was seen by synchronous (real-time) audio-video technology on 2/10/2021. Consent: Preston Farnsworth, who was seen by synchronous (real-time) audio-video technology, and/or her healthcare decision maker, is aware that this patient-initiated, Telehealth encounter on 2/10/2021 is a billable service, with coverage as determined by her insurance carrier. She is aware that she may receive a bill and has provided verbal consent to proceed: Yes. Assessment & Plan:   1. Supraclavicular lymphadenopathy  Right sided, ongoing for just days  Suspect reactive, has some ear symptoms same side and had an anterior cervical chain LN palpable and slightly painful as well per report  Watchful waiting x 2 wk  If no improvement or worsening she will reach out, we'll get an us of the area of interest and the patient will get an IR guided us if abnormal in appearance  She is understanding and agreeable  Certainly b/c of vv this visit has limitations so also discussed clavicular arthritis as a possibility , this I think is less likely          Pt was counseled on risks, benefits and alternatives of treatment options. All questions were asked and answered and the patient was agreeable with the treatment plan as outlined. Subjective:   Preston Farnsworth is a 50 y.o. female who was seen for Mass (Right side collar bone, denies pain.)      Several days swelling R collar bone anterior neck at clavicular head  Associated with r ear plugging intermittent  No cavities or mouth sores (recently went to dentist)  Associated with ant cervical chain lymphadenopathy that was palpable and mildly tender on the same side  No other symptoms or concerns    Medications, allergies, PMH, PSH, SOCH, RANDY [facility] reviewed and updated per routine protocol, see chart for review and changes if not noted here. ROS  A 12 point review of systems was negative except as noted here or in the HPI.     Objective:   Vital Signs: (As obtained by patient/caregiver at home)  Patient-Reported Vitals 2/10/2021   Patient-Reported Weight 171lb   Patient-Reported Pulse 83   Patient-Reported Temperature 97.3   Patient-Reported Systolic  361   Patient-Reported Diastolic 84        [INSTRUCTIONS:  \"[x]\" Indicates a positive item  \"[]\" Indicates a negative item  -- DELETE ALL ITEMS NOT EXAMINED]    Constitutional: [x] Appears well-developed and well-nourished [x] No apparent distress      [] Abnormal -     Mental status: [x] Alert and awake  [x] Oriented to person/place/time [x] Able to follow commands    [] Abnormal -     Eyes:   EOM    [x]  Normal    [] Abnormal -   Sclera  [x]  Normal    [] Abnormal -          Discharge [x]  None visible   [] Abnormal -     HENT: [x] Normocephalic, atraumatic  [] Abnormal -   [x] Mouth/Throat: Mucous membranes are moist    External Ears [x] Normal  [] Abnormal -    Neck: [x] No visualized mass [] Abnormal -     Pulmonary/Chest: [x] Respiratory effort normal   [x] No visualized signs of difficulty breathing or respiratory distress        [] Abnormal -      Musculoskeletal:   [x] Normal gait with no signs of ataxia         [x] Normal range of motion of neck        [] Abnormal -     Neurological:        [x] No Facial Asymmetry (Cranial nerve 7 motor function) (limited exam due to video visit)          [x] No gaze palsy        [] Abnormal -          Skin:        [x] No significant exanthematous lesions or discoloration noted on facial skin         [] Abnormal -            Psychiatric:       [x] Normal Affect [] Abnormal -        [x] No Hallucinations    Other pertinent observable physical exam findings:visible r clavicular head swelling    We discussed the expected course, resolution and complications of the diagnosis(es) in detail. Medication risks, benefits, costs, interactions, and alternatives were discussed as indicated.   I advised her to contact the office if her condition worsens, changes or fails to improve as anticipated. She expressed understanding with the diagnosis(es) and plan. Vera Gordon is a 50 y.o. female who was evaluated by a video visit encounter for concerns as above. Patient identification was verified prior to start of the visit. A caregiver was present when appropriate. Due to this being a TeleHealth encounter (During [facility] public [facility] emergency), evaluation of the following organ systems was limited: Vitals/Constitutional/EENT/Resp/CV/GI//MS/Neuro/Skin/Heme-Lymph-Imm. Pursuant to the emergency declaration under the [facility] waiver authority and the [employer] and [employer] Act, this Virtual  Visit was conducted, with patient's (and/or legal guardian's) consent, to reduce the patient's risk of exposure to COVID-19 and provide necessary medical care. Services were provided through a video synchronous discussion virtually to substitute for in-person clinic visit. Patient and provider were located at their individual homes. Lissette Burnham MD  [facility]  02/10/21 9:08 AM     Portions of this note may have been populated using smart dictation software and may have \"sounds-like\" errors present.

## 2021-03-08 ENCOUNTER — TELEPHONE (OUTPATIENT)
Dept: FAMILY MEDICINE CLINIC | Age: 56
End: 2021-03-08

## 2021-03-08 DIAGNOSIS — R59.0 SUPRACLAVICULAR LYMPHADENOPATHY: Primary | ICD-10-CM

## 2021-03-08 NOTE — TELEPHONE ENCOUNTER
Ultrasound and blood count ordered  This was what the patient and I discussed  pls have her get those done and we'll review

## 2021-03-08 NOTE — TELEPHONE ENCOUNTER
Pt requesting call back regarding painful knot near collar bone stating she mentioned it to Dr. Chrissy Chacon, was told it was probably nothing to be concerned about but to call back if symptoms continued.  Yari

## 2021-03-09 NOTE — TELEPHONE ENCOUNTER
After verifying patient's ID, advised patient per Dr Anuja Cherry Ultrasound and blood count ordered  This was what the patient and I discussed  pls have her get those done and we'll review. Patient verbalized understanding.

## 2021-03-13 ENCOUNTER — HOSPITAL ENCOUNTER (OUTPATIENT)
Dept: ULTRASOUND IMAGING | Age: 56
Discharge: HOME | End: 2021-03-13
Attending: FAMILY MEDICINE
Payer: COMMERCIAL

## 2021-03-13 DIAGNOSIS — R59.0 SUPRACLAVICULAR LYMPHADENOPATHY: ICD-10-CM

## 2021-03-13 PROCEDURE — 76604 US EXAM CHEST: CPT

## 2021-03-15 NOTE — PROGRESS NOTES
No significant or worrisome findings found to explain finding on physical exam, this is possibly a prominent fat pad, no further work up needed now

## 2021-03-22 DIAGNOSIS — I10 ESSENTIAL HYPERTENSION: ICD-10-CM

## 2021-03-22 RX ORDER — LISINOPRIL 10 MG/1
TABLET ORAL
Qty: 90 TAB | Refills: 0 | Status: SHIPPED | OUTPATIENT
Start: 2021-03-22 | End: 2021-06-04 | Stop reason: SDUPTHER

## 2021-03-22 NOTE — TELEPHONE ENCOUNTER
Called and spoke with pt. She has been advised and states understanding of the need for an office appointment and labs. Pt states she will call office back to schedule.

## 2021-06-04 ENCOUNTER — OFFICE VISIT (OUTPATIENT)
Dept: FAMILY MEDICINE CLINIC | Age: 56
End: 2021-06-04
Payer: COMMERCIAL

## 2021-06-04 VITALS
TEMPERATURE: 97.6 F | DIASTOLIC BLOOD PRESSURE: 92 MMHG | RESPIRATION RATE: 16 BRPM | HEIGHT: 66 IN | HEART RATE: 66 BPM | OXYGEN SATURATION: 99 % | BODY MASS INDEX: 27.8 KG/M2 | WEIGHT: 173 LBS | SYSTOLIC BLOOD PRESSURE: 148 MMHG

## 2021-06-04 DIAGNOSIS — Z00.00 WELL WOMAN EXAM (NO GYNECOLOGICAL EXAM): Primary | ICD-10-CM

## 2021-06-04 DIAGNOSIS — Z12.11 SCREEN FOR COLON CANCER: ICD-10-CM

## 2021-06-04 DIAGNOSIS — Z11.59 ENCOUNTER FOR HEPATITIS C SCREENING TEST FOR LOW RISK PATIENT: ICD-10-CM

## 2021-06-04 DIAGNOSIS — I47.1 SVT (SUPRAVENTRICULAR TACHYCARDIA) (HCC): ICD-10-CM

## 2021-06-04 DIAGNOSIS — G43.909 MIGRAINE WITHOUT STATUS MIGRAINOSUS, NOT INTRACTABLE, UNSPECIFIED MIGRAINE TYPE: ICD-10-CM

## 2021-06-04 DIAGNOSIS — I10 ESSENTIAL HYPERTENSION: ICD-10-CM

## 2021-06-04 PROCEDURE — 99396 PREV VISIT EST AGE 40-64: CPT | Performed by: FAMILY MEDICINE

## 2021-06-04 PROCEDURE — 99214 OFFICE O/P EST MOD 30 MIN: CPT | Performed by: FAMILY MEDICINE

## 2021-06-04 RX ORDER — LISINOPRIL 10 MG/1
20 TABLET ORAL DAILY
Qty: 90 TABLET | Refills: 0
Start: 2021-06-04 | End: 2021-07-23 | Stop reason: SDUPTHER

## 2021-06-04 NOTE — PROGRESS NOTES
Chief Complaint   Patient presents with    Well Woman     need colon ca screening.   noc at this time

## 2021-06-04 NOTE — PROGRESS NOTES
Subjective:   49 y.o. female for Well Woman Check. Her gyne and breast care is done elsewhere by her Ob-Gyne physician. Patient Active Problem List   Diagnosis Code    Anemia NEC     Disturbance of skin sensation R20.9    Migraine G43.909    Palpitations R00.2    Essential hypertension I10    SVT (supraventricular tachycardia) (HCC) I47.1     Past Medical History:   Diagnosis Date    Anemia 10/31/2011    Anemia NEC     Dr. Cornelius Goldstein     Past Surgical History:   Procedure Laterality Date    ENDOSCOPY, COLON, DIAGNOSTIC  10/07    HX  SECTION      HX TONSILLECTOMY       Family History   Problem Relation Age of Onset   Zackary Lynch Hypertension Mother     Hypertension Sister     Other Maternal Grandmother         anyeurism     Social History     Tobacco Use    Smoking status: Never Smoker    Smokeless tobacco: Never Used   Substance Use Topics    Alcohol use: No             ROS: Feeling generally well. No TIA's or unusual headaches, no dysphagia. No prolonged cough. No dyspnea or chest pain on exertion. No abdominal pain, change in bowel habits, black or bloody stools. No urinary tract symptoms. No new or unusual musculoskeletal symptoms. Specific concerns today: follow up. HTN: patient reports stable on medications. No chest pain, sob, headache, blurred vision, leg swelling, diaphoresis, falls. Compliant with medications as prescribed. intermittently checking blood pressures at home and reports range is 130-160/70-90. No significant hyper or hypotensive episodes that the patient reports today.   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 Visit Vitals  BP (!) 148/92   Pulse 66   Temp 97.6 °F (36.4 °C) (Tympanic)   Resp 16   Ht 5' 6\" (1.676 m)   Wt 173 lb (78.5 kg)   SpO2 99%   BMI 27.92 kg/m²     ENT normal.  Neck supple. No adenopathy or thyromegaly. CORBY. Lungs are clear, good air entry, no wheezes, rhonchi or rales. S1 and S2 normal, no murmurs, regular rate and rhythm. Abdomen soft without tenderness, guarding, mass or organomegaly. Extremities show no edema, normal peripheral pulses. Neurological is normal, no focal findings. Breast and Pelvic exams are deferred. Assessment/Plan:   Well Woman  increase physical activity, routine labs ordered, call if any problems  1. Well woman exam (no gynecological exam)  Well woman    2. Screen for colon cancer  Due for screening, per orders    3. SVT (supraventricular tachycardia) (HCC)  Mgmt per Dr Karma Dominguez, pt reports symptoms are managable    4. Migraine without status migrainosus, not intractable, unspecified migraine type  Stable, well controlled    5. Essential hypertension  Not quite controlled, increase lisinopril to 20mg daily, recheck 6 wk    6.  Encounter for hepatitis C screening test for low risk patient  Routine screening

## 2021-07-13 LAB — COLONOSCOPY, EXTERNAL: NORMAL

## 2021-07-23 ENCOUNTER — OFFICE VISIT (OUTPATIENT)
Dept: FAMILY MEDICINE CLINIC | Age: 56
End: 2021-07-23
Payer: COMMERCIAL

## 2021-07-23 VITALS
TEMPERATURE: 97 F | OXYGEN SATURATION: 98 % | BODY MASS INDEX: 28.28 KG/M2 | DIASTOLIC BLOOD PRESSURE: 84 MMHG | HEIGHT: 66 IN | SYSTOLIC BLOOD PRESSURE: 132 MMHG | RESPIRATION RATE: 16 BRPM | HEART RATE: 60 BPM | WEIGHT: 176 LBS

## 2021-07-23 DIAGNOSIS — I10 ESSENTIAL HYPERTENSION: ICD-10-CM

## 2021-07-23 PROCEDURE — 99213 OFFICE O/P EST LOW 20 MIN: CPT | Performed by: FAMILY MEDICINE

## 2021-07-23 RX ORDER — LISINOPRIL 20 MG/1
20 TABLET ORAL DAILY
Qty: 90 TABLET | Refills: 1 | Status: SHIPPED | OUTPATIENT
Start: 2021-07-23 | End: 2022-01-25

## 2021-07-23 NOTE — PROGRESS NOTES
Family Medicine Follow-Up Progress Note  Patient: Marilee Rodriguez  1965, 49 y.o., female  Encounter Date: 7/23/2021    ASSESSMENT & PLAN    ICD-10-CM ICD-9-CM    1. Essential hypertension  I10 401.9 lisinopriL (PRINIVIL, ZESTRIL) 20 mg tablet       Orders Placed This Encounter    lisinopriL (PRINIVIL, ZESTRIL) 20 mg tablet     Sig: Take 1 Tablet by mouth daily. Dispense:  90 Tablet     Refill:  1       Improved bp control  Will keep lisinopril at 20  Go for labs as prior ordered--re printed for patient  6 m follow  [facility]  Chief Complaint   Patient presents with    Follow-up     htn med compliant noc at this time        Yolanda Bae is a 49 y.o. female presenting today for follow up  HTN: patient reports stable on medications. No chest pain, sob, headache, blurred vision, leg swelling, diaphoresis, falls. Compliant with medications as prescribed. Is not checking blood pressures at home and reports range is unknown. No significant hyper or hypotensive episodes that the patient reports today. Doing ok with lisinoipril at 20, metorpolol cutting in half is difficult to manage    ROS  Review of Systems  A 12 point review of systems was negative except as noted here or in the HPI. OBJECTIVE  Visit Vitals  /84   Pulse 60   Temp 97 °F (36.1 °C) (Oral)   Resp 16   Ht 5' 6\" (1.676 m)   Wt 176 lb (79.8 kg)   SpO2 98%   BMI 28.41 kg/m²       Physical Exam  Vitals and nursing note reviewed. Constitutional:       General: She is not in acute distress. Appearance: Normal appearance. She is not ill-appearing, toxic-appearing or diaphoretic. HENT:      Head: Normocephalic and atraumatic. Right Ear: External ear normal.      Left Ear: External ear normal.      Mouth/Throat:      Mouth: Mucous membranes are moist.   Eyes:      General: No scleral icterus. Right eye: No discharge. Left eye: No discharge.       Comments: eom grossly intact   Neck: Comments: No visible neck masses , ROM appears normal from visual inspection  Cardiovascular:      Rate and Rhythm: Normal rate and regular rhythm. Pulses: Normal pulses. Heart sounds: Normal heart sounds. No murmur heard. No friction rub. No gallop. Pulmonary:      Effort: Pulmonary effort is normal. No respiratory distress. Breath sounds: No stridor. No wheezing or rhonchi. Abdominal:      General: There is no distension. Palpations: Abdomen is soft. Musculoskeletal:      Right lower leg: No edema. Left lower leg: No edema. Skin:     General: Skin is warm and dry. Capillary Refill: Capillary refill takes less than 2 seconds. Comments: Visible skin is without jaundice, bruising, lesion, pallor, erythema or rash except as otherwise noted   Neurological:      General: No focal deficit present. Mental Status: She is alert and oriented to person, place, and time. Psychiatric:         Mood and Affect: Mood normal.         Behavior: Behavior normal.         Thought Content: Thought content normal.         Judgment: Judgment normal.         No results found for any visits on 21.     HISTORICAL  Reviewed and updated today, and as noted below:    Past Medical History:   Diagnosis Date    Anemia 10/31/2011    Anemia NEC     Dr. Raul Foreman     Past Surgical History:   Procedure Laterality Date    ENDOSCOPY, COLON, DIAGNOSTIC  10/07    HX  SECTION      HX TONSILLECTOMY       Family History   Problem Relation Age of Onset   [facility] Hypertension Mother     Hypertension Sister     Other Maternal Grandmother         anyeurism     Social History     Tobacco Use   Smoking Status Never Smoker   Smokeless Tobacco Never Used     Social History     Socioeconomic History    Marital status:      Spouse name: Not on file    Number of children: Not on file    Years of education: Not on file    Highest education level: Not on file   Tobacco Use    Smoking status: Never Smoker    Smokeless tobacco: Never Used   Vaping Use    Vaping Use: Never used   Substance and Sexual Activity    Alcohol use: No    Drug use: No     Social Determinants of Health     Financial Resource Strain:     Difficulty of Paying Living Expenses:    Food Insecurity:     Worried About Running Out of Food in the Last Year:     [address] in the Last Year:    Transportation Needs:     Lack of Transportation (Medical):  Lack of Transportation (Non-Medical):    Physical Activity:     Days of Exercise per Week:     Minutes of Exercise per Session:    Stress:     Feeling of Stress :    Social Connections:     Frequency of Communication with Friends and Family:     Frequency of Social Gatherings with Friends and Family:     Attends Gnosticism Services:     Active Member of Clubs or Organizations:     Attends Club or Organization Meetings:     Marital Status:      Allergies   Allergen Reactions    Lactose Diarrhea       LAB REVIEW  Lab Results   Component Value Date/Time    Sodium 140 02/24/2020 12:59 PM    Potassium 3.8 02/24/2020 12:59 PM    Chloride 107 02/24/2020 12:59 PM    CO2 31 02/24/2020 12:59 PM    Anion gap 2 (L) 02/24/2020 12:59 PM    Glucose 88 02/24/2020 12:59 PM    BUN 15 02/24/2020 12:59 PM    Creatinine 0.78 02/24/2020 12:59 PM    BUN/Creatinine ratio 19 02/24/2020 12:59 PM    GFR est AA >60 02/24/2020 12:59 PM    GFR est non-AA >60 02/24/2020 12:59 PM    Calcium 9.2 02/24/2020 12:59 PM    Bilirubin, total 0.6 02/24/2020 12:59 PM    Alk.  phosphatase 89 02/24/2020 12:59 PM    Protein, total 7.3 02/24/2020 12:59 PM    Albumin 3.9 02/24/2020 12:59 PM    Globulin 3.4 02/24/2020 12:59 PM    A-G Ratio 1.1 02/24/2020 12:59 PM    ALT (SGPT) 37 02/24/2020 12:59 PM     Lab Results   Component Value Date/Time    WBC 3.0 (L) 02/24/2020 12:59 PM    HGB 12.1 02/24/2020 12:59 PM    HCT 37.4 02/24/2020 12:59 PM    PLATELET 547 97/64/1821 12:59 PM    MCV 98.9 02/24/2020 12:59 PM     No results found for: HBA1C, EUO7MPQO, HCQ3DHQZ, CUA3YSGW  Lab Results   Component Value Date/Time    Cholesterol, total 166 08/22/2018 11:13 AM    HDL Cholesterol 68 08/22/2018 11:13 AM    LDL, calculated 90 08/22/2018 11:13 AM    VLDL, calculated 8 08/22/2018 11:13 AM    Triglyceride 41 08/22/2018 11:13 AM           [facility] Sarina Asencio [facility]  07/23/21 8:48 AM    Portions of this note may have been populated using smart dictation software and may have \"sounds-like\" errors present. Pt was counseled on risks, benefits and alternatives of treatment options. All questions were asked and answered and the patient was agreeable with the treatment plan as outlined.

## 2021-07-24 LAB
ALBUMIN SERPL-MCNC: 4.4 G/DL (ref 3.8–4.9)
ALBUMIN/GLOB SERPL: 1.8 {RATIO} (ref 1.2–2.2)
ALP SERPL-CCNC: 96 IU/L (ref 48–121)
ALT SERPL-CCNC: 16 IU/L (ref 0–32)
AST SERPL-CCNC: 17 IU/L (ref 0–40)
BASOPHILS # BLD AUTO: 0 X10E3/UL (ref 0–0.2)
BASOPHILS NFR BLD AUTO: 0 %
BILIRUB SERPL-MCNC: 0.5 MG/DL (ref 0–1.2)
BUN SERPL-MCNC: 13 MG/DL (ref 6–24)
BUN/CREAT SERPL: 16 (ref 9–23)
CALCIUM SERPL-MCNC: 9.5 MG/DL (ref 8.7–10.2)
CHLORIDE SERPL-SCNC: 106 MMOL/L (ref 96–106)
CHOLEST SERPL-MCNC: 169 MG/DL (ref 100–199)
CO2 SERPL-SCNC: 29 MMOL/L (ref 20–29)
CREAT SERPL-MCNC: 0.81 MG/DL (ref 0.57–1)
EOSINOPHIL # BLD AUTO: 0 X10E3/UL (ref 0–0.4)
EOSINOPHIL NFR BLD AUTO: 2 %
ERYTHROCYTE [DISTWIDTH] IN BLOOD BY AUTOMATED COUNT: 13.1 % (ref 11.7–15.4)
GLOBULIN SER CALC-MCNC: 2.5 G/DL (ref 1.5–4.5)
GLUCOSE SERPL-MCNC: 94 MG/DL (ref 65–99)
HCT VFR BLD AUTO: 35.4 % (ref 34–46.6)
HCV AB S/CO SERPL IA: <0.1 S/CO RATIO (ref 0–0.9)
HDLC SERPL-MCNC: 60 MG/DL
HGB BLD-MCNC: 11.9 G/DL (ref 11.1–15.9)
IMM GRANULOCYTES # BLD AUTO: 0 X10E3/UL (ref 0–0.1)
IMM GRANULOCYTES NFR BLD AUTO: 0 %
IMP & REVIEW OF LAB RESULTS: NORMAL
LDLC SERPL CALC-MCNC: 101 MG/DL (ref 0–99)
LYMPHOCYTES # BLD AUTO: 1.1 X10E3/UL (ref 0.7–3.1)
LYMPHOCYTES NFR BLD AUTO: 39 %
MCH RBC QN AUTO: 32 PG (ref 26.6–33)
MCHC RBC AUTO-ENTMCNC: 33.6 G/DL (ref 31.5–35.7)
MCV RBC AUTO: 95 FL (ref 79–97)
MONOCYTES # BLD AUTO: 0.3 X10E3/UL (ref 0.1–0.9)
MONOCYTES NFR BLD AUTO: 10 %
NEUTROPHILS # BLD AUTO: 1.3 X10E3/UL (ref 1.4–7)
NEUTROPHILS NFR BLD AUTO: 49 %
PLATELET # BLD AUTO: 241 X10E3/UL (ref 150–450)
POTASSIUM SERPL-SCNC: 4.6 MMOL/L (ref 3.5–5.2)
PROT SERPL-MCNC: 6.9 G/DL (ref 6–8.5)
RBC # BLD AUTO: 3.72 X10E6/UL (ref 3.77–5.28)
SODIUM SERPL-SCNC: 143 MMOL/L (ref 134–144)
TRIGL SERPL-MCNC: 34 MG/DL (ref 0–149)
VLDLC SERPL CALC-MCNC: 8 MG/DL (ref 5–40)
WBC # BLD AUTO: 2.8 X10E3/UL (ref 3.4–10.8)

## 2021-09-21 ENCOUNTER — TELEPHONE (OUTPATIENT)
Dept: FAMILY MEDICINE CLINIC | Age: 56
End: 2021-09-21

## 2021-09-21 NOTE — TELEPHONE ENCOUNTER
Fall a week ago  Pain to right leg with bruising and swelling. No relief in past week. She was unable to be seen at urgent care. Please advise on recommendations or should pt be put on your schedule?   [phone]

## 2021-09-23 ENCOUNTER — OFFICE VISIT (OUTPATIENT)
Dept: FAMILY MEDICINE CLINIC | Age: 56
End: 2021-09-23
Payer: COMMERCIAL

## 2021-09-23 ENCOUNTER — HOSPITAL ENCOUNTER (OUTPATIENT)
Dept: GENERAL RADIOLOGY | Age: 56
Discharge: HOME | End: 2021-09-23
Payer: COMMERCIAL

## 2021-09-23 VITALS
HEIGHT: 66 IN | RESPIRATION RATE: 18 BRPM | HEART RATE: 61 BPM | TEMPERATURE: 97 F | BODY MASS INDEX: 28.41 KG/M2 | DIASTOLIC BLOOD PRESSURE: 104 MMHG | OXYGEN SATURATION: 98 % | SYSTOLIC BLOOD PRESSURE: 153 MMHG

## 2021-09-23 DIAGNOSIS — I10 ESSENTIAL HYPERTENSION: ICD-10-CM

## 2021-09-23 DIAGNOSIS — S80.11XD CONTUSION OF RIGHT LOWER LEG, SUBSEQUENT ENCOUNTER: ICD-10-CM

## 2021-09-23 DIAGNOSIS — S80.11XD CONTUSION OF RIGHT LOWER LEG, SUBSEQUENT ENCOUNTER: Primary | ICD-10-CM

## 2021-09-23 PROCEDURE — 73590 X-RAY EXAM OF LOWER LEG: CPT

## 2021-09-23 PROCEDURE — 99214 OFFICE O/P EST MOD 30 MIN: CPT | Performed by: FAMILY MEDICINE

## 2021-09-23 RX ORDER — NAPROXEN 500 MG/1
500 TABLET ORAL
Qty: 60 TABLET | Refills: 0 | Status: SHIPPED | OUTPATIENT
Start: 2021-09-23 | End: 2021-10-18

## 2021-09-23 RX ORDER — DICLOFENAC SODIUM 10 MG/G
2 GEL TOPICAL 4 TIMES DAILY
Qty: 450 G | Refills: 0 | Status: SHIPPED | OUTPATIENT
Start: 2021-09-23

## 2021-09-23 NOTE — PROGRESS NOTES
Chief Complaint   Patient presents with    Fall     09/13/21; hit rt shin falling off \"airport table\"; \"still painful and swelling\" ; right     1. Have you been to the ER, urgent care clinic since your last visit? Hospitalized since your last visit? No     2. Have you seen or consulted any other health care providers outside of the [address] [place] [address] since your last visit? Include any pap smears or colon screening.   No     Visit Vitals  BP (!) 153/104   Pulse 61   Temp 97 °F (36.1 °C) (Temporal)   Resp 18   Ht 5' 6\" (1.676 m)   SpO2 98%   BMI 28.41 kg/m²

## 2021-09-23 NOTE — PROGRESS NOTES
Family Medicine Acute Visit Progress Note  Patient: Haile Soni  1965, 49 y.o., female  Encounter Date: 9/23/2021    ASSESSMENT & PLAN    ICD-10-CM ICD-9-CM    1. Contusion of right lower leg, subsequent encounter  S80.11XD V58.89 XR TIB/FIB RT     924.10 naproxen (NAPROSYN) 500 mg tablet      diclofenac (VOLTAREN) 1 % gel   2. Essential hypertension  I10 401.9        Orders Placed This Encounter    XR TIB/FIB RT     Standing Status:   Future     Standing Expiration Date:   10/23/2022    naproxen (NAPROSYN) 500 mg tablet     Sig: Take 1 Tablet by mouth two (2) times daily as needed for Pain (take with food). Dispense:  60 Tablet     Refill:  0    diclofenac (VOLTAREN) 1 % gel     Sig: Apply 2 g to affected area four (4) times daily. Dispense:  450 g     Refill:  0     XR for reassurance  Suspect this is a very bad contusion  Supportive care at home  bp not controlled-? Due to pain, recheck with cardiology soon, if still high see me back 1 m for recheck and titration if needed  [address] [place] [address]  Chief Complaint   Patient presents with    Fall     09/13/21; hit rt shin falling off \"airport table\";  \"still painful and swelling\" ; right       SUBJECTIVE  Haile Soni is a 49 y.o. female presenting today for  An injury that occurred a few days ago  She stood from a chair, turned and ran right into a table, fell in the airport terminal and right shin began to swell almost immediately  She then was immediately on a plane to [facility] icing it  When she got home she kept having swelling, it drifted down to her ankle  The pain has persisted  After a week she worried that she had damaged something  She went to 3 different     When she walks on it now she feels like it starts to hurt after too long  When she gets home from work she elevates and ices it  She's able to be up and down during the day    If she tries to stretch her anterior shin it feels tight and starts to hurt    It is getting noticeably better        Review of Systems  A 12 point review of systems was negative except as noted here or in the HPI. OBJECTIVE  Visit Vitals  BP (!) 153/104   Pulse 61   Temp 97 °F (36.1 °C) (Temporal)   Resp 18   Ht 5' 6\" (1.676 m)   SpO2 98%   BMI 28.41 kg/m²       Physical Exam  Vitals and nursing note reviewed. Constitutional:       General: She is not in acute distress. Appearance: Normal appearance. She is not ill-appearing, toxic-appearing or diaphoretic. HENT:      Head: Normocephalic and atraumatic. Right Ear: External ear normal.      Left Ear: External ear normal.   Eyes:      General: No scleral icterus. Right eye: No discharge. Left eye: No discharge. Comments: eom grossly intact   Neck:      Comments: No visible neck masses , ROM appears normal from visual inspection  Cardiovascular:      Rate and Rhythm: Normal rate and regular rhythm. Heart sounds: No friction rub. No gallop. Pulmonary:      Effort: Pulmonary effort is normal. No respiratory distress. Musculoskeletal:         General: Signs of injury (R anterior shin exquisitely ttp with mild edema and a very small skin tear without significant bruiising now or ankle sweling today) present. Comments: Ankle strength and rom intact   Skin:     Comments: Visible skin is without jaundice, bruising, lesion, pallor, erythema or rash except as otherwise noted   Neurological:      General: No focal deficit present. Mental Status: She is alert and oriented to person, place, and time. Psychiatric:         Mood and Affect: Mood normal.         Behavior: Behavior normal.         Thought Content: Thought content normal.         Judgment: Judgment normal.         No results found for any visits on 09/23/21. HISTORICAL  PMH, PSH, FHX, SOCHX, ALLERGIES and MEDS were reviewed and updated today.     James Pickard MD  [facility]  09/23/21 11:44 AM    Portions of this note may have been populated using smart dictation software and may have \"sounds-like\" errors present. Pt was counseled on risks, benefits and alternatives of treatment options. All questions were asked and answered and the patient was agreeable with the treatment plan as outlined.

## 2021-10-16 DIAGNOSIS — S80.11XD CONTUSION OF RIGHT LOWER LEG, SUBSEQUENT ENCOUNTER: ICD-10-CM

## 2021-10-18 RX ORDER — NAPROXEN 500 MG/1
500 TABLET ORAL
Qty: 60 TABLET | Refills: 0 | Status: SHIPPED | OUTPATIENT
Start: 2021-10-18

## 2022-01-25 DIAGNOSIS — I10 ESSENTIAL HYPERTENSION: ICD-10-CM

## 2022-01-25 RX ORDER — LISINOPRIL 20 MG/1
TABLET ORAL
Qty: 90 TABLET | Refills: 1 | Status: SHIPPED | OUTPATIENT
Start: 2022-01-25 | End: 2022-08-01

## 2022-03-18 PROBLEM — R00.2 PALPITATIONS: Status: ACTIVE | Noted: 2020-03-02

## 2022-03-19 PROBLEM — I47.1 SVT (SUPRAVENTRICULAR TACHYCARDIA) (HCC): Status: ACTIVE | Noted: 2020-04-16

## 2022-03-19 PROBLEM — I47.10 SVT (SUPRAVENTRICULAR TACHYCARDIA): Status: ACTIVE | Noted: 2020-04-16

## 2022-03-20 PROBLEM — I10 ESSENTIAL HYPERTENSION: Status: ACTIVE | Noted: 2020-03-02

## 2022-08-01 DIAGNOSIS — I10 ESSENTIAL HYPERTENSION: ICD-10-CM

## 2022-08-01 RX ORDER — LISINOPRIL 20 MG/1
TABLET ORAL
Qty: 90 TABLET | Refills: 1 | Status: SHIPPED | OUTPATIENT
Start: 2022-08-01

## 2022-08-01 NOTE — TELEPHONE ENCOUNTER
Labs due  In office visit due this fall  Please ask pt to schedule a follow up so we can safely continue to fill medications

## 2022-08-01 NOTE — LETTER
8/2/2022 9:09 AM    Ms. Alarcon Jose J Balderas  [address] [facility] [address] [id number]      Dear Ms. Braun:    We've missed you! Please call our office at [phone] and schedule a follow up appointment for your continued care, in office. You will also be due for fasting labs. I will be unable to continue refilling your medication until you have been seen for an appointment. Look forward to seeing you soon.          Sincerely,      Roxane West MD

## 2022-08-17 ENCOUNTER — PATIENT OUTREACH (OUTPATIENT)
Dept: CASE MANAGEMENT | Age: 57
End: 2022-08-17

## 2022-08-17 ENCOUNTER — NURSE TRIAGE (OUTPATIENT)
Dept: OTHER | Facility: CLINIC | Age: 57
End: 2022-08-17

## 2022-08-17 NOTE — PROGRESS NOTES
08/17/22  10:37 AM  Attempted initial ACM outreach, no answer- lvm and sent [address] message for return call. Will make 1 more attempt tomorrow if no callback. 08/18/22  12:52 PM  Ambulatory Care Management Note    Date/Time:  8/18/2022 12:52 PM    This patient was received as a referral from [facility]. Ambulatory Care Manager outreached to patient today to offer care management services. Introduction to self and role of care manager provided. Patient declined care management services at this time. No follow up call scheduled at this time. Patient has Ambulatory Care Manager's contact number for for any questions or concerns.

## 2022-08-17 NOTE — TELEPHONE ENCOUNTER
Received call from Analia at [facility] with Red Flag Complaint. Subjective: Caller states \"high bp and headaches\"     Current Symptoms: Systolic range 116-463/84-48A. Automatic home bp cuff, left upper arm. Takes at varying times throughout the day. Lisinopril and Metoprolol, taking as rx in the morning. 8/14 morning 139/95  8/15 evening time 151/97, feeling unwell with headache  8/16 6:06 126/83  8/17 6:30am 138/89    Left foot tingling intermittently, last episode last week. Able to walk on foot during episodes, unsure what brings this on. Reports work-related stress     Hx vertigo, possibly triggered over the last few weeks. Denies dizziness right now. Onset: Several weeks    Associated Symptoms: reduced activity    Pain Severity: Headaches (increasing in frequency), currently slight headache, worse when tilting head or bending over. Headache much worse on Friday per pt report. Temperature: Denies    What has been tried: See above    LMP: NA Pregnant: NA    Recommended disposition: See in Office Within 2 Weeks    Care advice provided, patient verbalizes understanding; denies any other questions or concerns; instructed to call back for any new or worsening symptoms. Patient/Caller agrees with recommended disposition; writer provided warm transfer to Leonardo Chávez at [facility] for appointment scheduling    Attention Provider: Thank you for allowing me to participate in the care of your patient. The patient was connected to triage in response to information provided to the [facility]. Please do not respond through this encounter as the response is not directed to a shared pool.       Reason for Disposition   Systolic BP >= 553 OR Diastolic >= 80, and is taking BP medications    Protocols used: Blood Pressure - High-ADULT-[place]

## 2022-08-18 ENCOUNTER — OFFICE VISIT (OUTPATIENT)
Dept: FAMILY MEDICINE CLINIC | Age: 57
End: 2022-08-18
Payer: COMMERCIAL

## 2022-08-18 VITALS
BODY MASS INDEX: 28.45 KG/M2 | OXYGEN SATURATION: 98 % | RESPIRATION RATE: 18 BRPM | HEART RATE: 90 BPM | SYSTOLIC BLOOD PRESSURE: 136 MMHG | DIASTOLIC BLOOD PRESSURE: 82 MMHG | TEMPERATURE: 97.2 F | HEIGHT: 66 IN | WEIGHT: 177 LBS

## 2022-08-18 DIAGNOSIS — G44.229 CHRONIC TENSION-TYPE HEADACHE, NOT INTRACTABLE: ICD-10-CM

## 2022-08-18 DIAGNOSIS — F43.9 SITUATIONAL STRESS: Primary | ICD-10-CM

## 2022-08-18 DIAGNOSIS — I10 ESSENTIAL HYPERTENSION: ICD-10-CM

## 2022-08-18 PROBLEM — I47.10 SVT (SUPRAVENTRICULAR TACHYCARDIA): Status: RESOLVED | Noted: 2020-04-16 | Resolved: 2022-08-18

## 2022-08-18 PROBLEM — I47.1 SVT (SUPRAVENTRICULAR TACHYCARDIA) (HCC): Status: RESOLVED | Noted: 2020-04-16 | Resolved: 2022-08-18

## 2022-08-18 PROCEDURE — 99214 OFFICE O/P EST MOD 30 MIN: CPT | Performed by: FAMILY MEDICINE

## 2022-08-18 RX ORDER — CITALOPRAM 10 MG/1
10 TABLET ORAL DAILY
Qty: 30 TABLET | Refills: 1 | Status: SHIPPED | OUTPATIENT
Start: 2022-08-18 | End: 2022-09-09 | Stop reason: SDUPTHER

## 2022-08-18 NOTE — PROGRESS NOTES
Chief Complaint   Patient presents with    Headache     HA for 2 wk straight no aggravating or relieving factors          1. \"Have you been to the ER, urgent care clinic since your last visit? Hospitalized since your last visit? \" No    2. \"Have you seen or consulted any other health care providers outside of the [address] [place] [address] since your last visit? \" No     3. For patients aged 39-70: Has the patient had a colonoscopy / FIT/ Cologuard? Yes - no Care Gap present      If the patient is female:    4. For patients aged 41-77: Has the patient had a mammogram within the past 2 years? Yes - no Care Gap present      5. For patients aged 21-65: Has the patient had a pap smear?  Yes - no Care Gap present

## 2022-08-18 NOTE — PROGRESS NOTES
Family Medicine Follow-Up Progress Note  Patient: Rafi Leggett  1965, 56 y.o., female  Encounter Date: 8/18/2022    ASSESSMENT & PLAN    ICD-10-CM ICD-9-CM    1. Situational stress  F43.9 V62.89 citalopram (CeleXA) 10 mg tablet      2. Essential hypertension  I10 401.9       3. Chronic tension-type headache, not intractable  G44.229 339.12           Orders Placed This Encounter    citalopram (CeleXA) 10 mg tablet     Sig: Take 1 Tablet by mouth daily. Dispense:  30 Tablet     Refill:  1         Patient Instructions   Try 400-800 of magnesium oxide at bedtime    Blood pressure is ok at or near goal    We think stressors are causing this perhaps    Trial celexa  Recheck sabina bout 3-4 weeks    Can increase if needed    May not initially have response    Full effect of dose reaches plateau at 4-6 weeks    CHIEF COMPLAINT  Chief Complaint   Patient presents with    Headache     HA for 2 wk straight no aggravating or relieving factors          SUBJECTIVE  Rafi Leggett is a 56 y.o. female presenting today for follow up bc she's been having headaches    The headaches are frontal  Yesterday had a sharp pain in the back of her shoulders  Tylenol does not help  Staying hydrated      Yesterday she talked to her therapist    She says she thinks her headaches are related to stress     HTN: patient reports stable on medications. No chest pain, sob, headache, blurred vision, leg swelling, diaphoresis, falls. Compliant with medications as prescribed. is checking blood pressures at home and reports range is 102/70 in June now running 120-160/80-90s. No significant hyper or hypotensive episodes that the patient reports today.     Exercising every day--likes walking, aerobic exercise, HIIT--doing about 45 minutes / day, working with a nurse on this  Doing about 15 minutes of meditation daily when able to  Trying to disconnect from work at home  Trying to eat a healthy diet    Wt Readings from Last 3 Encounters: 22 177 lb (80.3 kg)   21 176 lb (79.8 kg)   21 173 lb (78.5 kg)       ROS  Review of Systems  A 12 point review of systems was negative except as noted here or in the HPI. OBJECTIVE  Visit Vitals  /82   Pulse 90   Temp 97.2 °F (36.2 °C) (Temporal)   Resp 18   Ht 5' 6\" (1.676 m)   Wt 177 lb (80.3 kg)   SpO2 98%   BMI 28.57 kg/m²       Physical Exam  Vitals and nursing note reviewed. Constitutional:       General: She is not in acute distress. Appearance: Normal appearance. She is normal weight. She is not ill-appearing, toxic-appearing or diaphoretic. HENT:      Head: Normocephalic and atraumatic. Right Ear: External ear normal.      Left Ear: External ear normal.      Mouth/Throat:      Mouth: Mucous membranes are moist.   Eyes:      General: No scleral icterus. Right eye: No discharge. Left eye: No discharge. Comments: eom grossly intact   Neck:      Comments: No visible neck masses , ROM appears normal from visual inspection  Cardiovascular:      Rate and Rhythm: Normal rate and regular rhythm. Pulmonary:      Effort: Pulmonary effort is normal. No respiratory distress. Skin:     Comments: Visible skin is without jaundice, bruising, lesion, pallor, erythema or rash except as otherwise noted   Neurological:      General: No focal deficit present. Mental Status: She is alert and oriented to person, place, and time. Psychiatric:         Mood and Affect: Mood normal.         Behavior: Behavior normal.         Thought Content: Thought content normal.         Judgment: Judgment normal.       No results found for any visits on 22.     HISTORICAL  Reviewed and updated today, and as noted below:    Past Medical History:   Diagnosis Date    Anemia 10/31/2011    Anemia NEC     Dr. Reshma Zavaleta     Past Surgical History:   Procedure Laterality Date    ENDOSCOPY, COLON, DIAGNOSTIC  10/07    HX  SECTION      HX TONSILLECTOMY       Family History Problem Relation Age of Onset    Hypertension Mother     Hypertension Sister     Other Maternal Grandmother         anyeurism     Social History     Tobacco Use   Smoking Status Never   Smokeless Tobacco Never     Social History     Socioeconomic History    Marital status:    Tobacco Use    Smoking status: Never    Smokeless tobacco: Never   Vaping Use    Vaping Use: Never used   Substance and Sexual Activity    Alcohol use: No    Drug use: No     Allergies   Allergen Reactions    Lactose Diarrhea       LAB REVIEW  Lab Results   Component Value Date/Time    Sodium 143 07/23/2021 09:48 AM    Potassium 4.6 07/23/2021 09:48 AM    Chloride 106 07/23/2021 09:48 AM    CO2 29 07/23/2021 09:48 AM    Anion gap 2 (L) 02/24/2020 12:59 PM    Glucose 94 07/23/2021 09:48 AM    BUN 13 07/23/2021 09:48 AM    Creatinine 0.81 07/23/2021 09:48 AM    BUN/Creatinine ratio 16 07/23/2021 09:48 AM    GFR est AA 95 07/23/2021 09:48 AM    GFR est non-AA 83 07/23/2021 09:48 AM    Calcium 9.5 07/23/2021 09:48 AM    Bilirubin, total 0.5 07/23/2021 09:48 AM    Alk.  phosphatase 96 07/23/2021 09:48 AM    Protein, total 6.9 07/23/2021 09:48 AM    Albumin 4.4 07/23/2021 09:48 AM    Globulin 3.4 02/24/2020 12:59 PM    A-G Ratio 1.8 07/23/2021 09:48 AM    ALT (SGPT) 16 07/23/2021 09:48 AM     Lab Results   Component Value Date/Time    WBC 2.8 (L) 07/23/2021 09:48 AM    HGB 11.9 07/23/2021 09:48 AM    HCT 35.4 07/23/2021 09:48 AM    PLATELET 679 10/21/7932 09:48 AM    MCV 95 07/23/2021 09:48 AM     No results found for: HBA1C, CGH8KCMV, BSN0GJHB, UAL4PPKQ  Lab Results   Component Value Date/Time    Cholesterol, total 169 07/23/2021 09:48 AM    HDL Cholesterol 60 07/23/2021 09:48 AM    LDL, calculated 101 (H) 07/23/2021 09:48 AM    LDL, calculated 90 08/22/2018 11:13 AM    VLDL, calculated 8 07/23/2021 09:48 AM    VLDL, calculated 8 08/22/2018 11:13 AM    Triglyceride 34 07/23/2021 09:48 AM           Rachel Garcia MD  [facility]  08/18/22 8:13 AM    Portions of this note may have been populated using smart dictation software and may have \"sounds-like\" errors present. Pt was counseled on risks, benefits and alternatives of treatment options. All questions were asked and answered and the patient was agreeable with the treatment plan as outlined.

## 2022-09-09 DIAGNOSIS — F43.9 SITUATIONAL STRESS: ICD-10-CM

## 2022-09-09 RX ORDER — CITALOPRAM 10 MG/1
10 TABLET ORAL DAILY
Qty: 90 TABLET | Refills: 1 | Status: SHIPPED | OUTPATIENT
Start: 2022-09-09

## 2022-09-12 ENCOUNTER — VIRTUAL VISIT (OUTPATIENT)
Dept: FAMILY MEDICINE CLINIC | Age: 57
End: 2022-09-12
Payer: COMMERCIAL

## 2022-09-12 DIAGNOSIS — F43.9 SITUATIONAL STRESS: Primary | ICD-10-CM

## 2022-09-12 DIAGNOSIS — I10 ESSENTIAL HYPERTENSION: ICD-10-CM

## 2022-09-12 DIAGNOSIS — G44.229 CHRONIC TENSION-TYPE HEADACHE, NOT INTRACTABLE: ICD-10-CM

## 2022-09-12 PROCEDURE — 99213 OFFICE O/P EST LOW 20 MIN: CPT | Performed by: FAMILY MEDICINE

## 2022-09-12 NOTE — PROGRESS NOTES
Doron Marcano is a 56 y.o. female who was seen by synchronous (real-time) audio-video technology on 9/12/2022. Consent: Doron Marcano, who was seen by synchronous (real-time) audio-video technology, and/or her healthcare decision maker, is aware that this patient-initiated, Telehealth encounter on 9/12/2022 is a billable service, with coverage as determined by her insurance carrier. She is aware that she may receive a bill and has provided verbal consent to proceed: Yes. Assessment & Plan:       ICD-10-CM ICD-9-CM    1. Situational stress  F43.9 V62.89       2. Essential hypertension  I10 401.9       3. Chronic tension-type headache, not intractable  G44.229 339.12         Tremendous improvement  Stay with celexa 10  Recheck in 3 mo  Call with concerns  Headaches improved  Bp check in office next visit        Pt was counseled on risks, benefits and alternatives of treatment options. All questions were asked and answered and the patient was agreeable with the treatment plan as outlined. Subjective:   Doron Marcano is a 56 y.o. female who was seen for Medication Evaluation (Celexa follow up- no concerns/working well )      Tells me that mood is really good, things aren't really getting to her, she is not having reactions (not in a bad way, just she's not crying, not pulling her hair out, she's able to sleep)    She says she's just about 98% improved from before we started medication    She says her anxious thoughts are not over-bearing, she's able to focus and bring it back.  Even with her job being crazy around her, she can pivot and is adaptable    Not having headaches  She had one from not eating    No notable side effects that she's aware of  No dizziness, no si/hi or avh    Her daughter got  on sept 1st and she managed the stress really well and she got to enjoy herself    Recovering from disappointment quicker than usual    Medications, allergies, PMH, PSH, SOCH, [facility] reviewed and updated per routine protocol, see chart for review and changes if not noted here. ROS  A 12 point review of systems was negative except as noted here or in the HPI. Objective:   Vital Signs: (As obtained by patient/caregiver at home)  Patient-Reported Vitals 9/12/2022   Patient-Reported Weight 178lbs   Patient-Reported Pulse -   Patient-Reported Temperature -   Patient-Reported Systolic  -   Patient-Reported Diastolic -        [INSTRUCTIONS:  \"[x]\" Indicates a positive item  \"[]\" Indicates a negative item  -- DELETE ALL ITEMS NOT EXAMINED]    Constitutional: [x] Appears well-developed and well-nourished [x] No apparent distress      [] Abnormal -     Mental status: [x] Alert and awake  [x] Oriented to person/place/time [x] Able to follow commands    [] Abnormal -     Eyes:   EOM    [x]  Normal    [] Abnormal -   Sclera  [x]  Normal    [] Abnormal -          Discharge [x]  None visible   [] Abnormal -     HENT: [x] Normocephalic, atraumatic  [] Abnormal -   [x] Mouth/Throat: Mucous membranes are moist    External Ears [x] Normal  [] Abnormal -    Neck: [x] No visualized mass [] Abnormal -     Pulmonary/Chest: [x] Respiratory effort normal   [x] No visualized signs of difficulty breathing or respiratory distress        [] Abnormal -      Musculoskeletal:   [] Normal gait with no signs of ataxia         [x] Normal range of motion of neck        [] Abnormal -     Neurological:        [x] No Facial Asymmetry (Cranial nerve 7 motor function) (limited exam due to video visit)          [x] No gaze palsy        [] Abnormal -          Skin:        [x] No significant exanthematous lesions or discoloration noted on facial skin         [] Abnormal -            Psychiatric:       [x] Normal Affect [] Abnormal -        [x] No Hallucinations    Other pertinent observable physical exam findings:seated, wearing glasses    We discussed the expected course, resolution and complications of the diagnosis(es) in detail.   Medication risks, benefits, costs, interactions, and alternatives were discussed as indicated. I advised her to contact the office if her condition worsens, changes or fails to improve as anticipated. She expressed understanding with the diagnosis(es) and plan. Chaitanya Garcia is a 56 y.o. female who was evaluated by a video visit encounter for concerns as above. Patient identification was verified prior to start of the visit. A caregiver was present when appropriate. Due to this being a TeleHealth encounter (During NCAFX-89 public health emergency), evaluation of the following organ systems was limited: Vitals/Constitutional/EENT/Resp/CV/GI//MS/Neuro/Skin/Heme-Lymph-Imm. Pursuant to the emergency declaration under the [facility] waiver authority and the [employer] and [employer] Act, this Virtual  Visit was conducted, with patient's (and/or legal guardian's) consent, to reduce the patient's risk of exposure to COVID-19 and provide necessary medical care. Services were provided through a video synchronous discussion virtually to substitute for in-person clinic visit. Patient and provider were located at their individual homes. Elisabeth Gee MD  [facility]  09/12/22 2:31 PM     Portions of this note may have been populated using smart dictation software and may have \"sounds-like\" errors present.

## 2022-09-12 NOTE — PROGRESS NOTES
Chief Complaint   Patient presents with    Medication Evaluation     Celexa follow up- no concerns/working well

## 2022-11-25 LAB — MAMMOGRAPHY, EXTERNAL: NEGATIVE

## 2022-12-20 ENCOUNTER — OFFICE VISIT (OUTPATIENT)
Dept: FAMILY MEDICINE CLINIC | Age: 57
End: 2022-12-20
Payer: COMMERCIAL

## 2022-12-20 VITALS
OXYGEN SATURATION: 99 % | SYSTOLIC BLOOD PRESSURE: 139 MMHG | TEMPERATURE: 97.1 F | RESPIRATION RATE: 18 BRPM | HEIGHT: 66 IN | DIASTOLIC BLOOD PRESSURE: 100 MMHG | WEIGHT: 189 LBS | HEART RATE: 84 BPM | BODY MASS INDEX: 30.37 KG/M2

## 2022-12-20 DIAGNOSIS — F43.9 SITUATIONAL STRESS: Primary | ICD-10-CM

## 2022-12-20 DIAGNOSIS — I10 ESSENTIAL HYPERTENSION: ICD-10-CM

## 2022-12-20 DIAGNOSIS — R63.5 UNINTENDED WEIGHT GAIN: ICD-10-CM

## 2022-12-20 PROCEDURE — 3074F SYST BP LT 130 MM HG: CPT | Performed by: FAMILY MEDICINE

## 2022-12-20 PROCEDURE — 3078F DIAST BP <80 MM HG: CPT | Performed by: FAMILY MEDICINE

## 2022-12-20 PROCEDURE — 99214 OFFICE O/P EST MOD 30 MIN: CPT | Performed by: FAMILY MEDICINE

## 2022-12-20 RX ORDER — LISINOPRIL 30 MG/1
30 TABLET ORAL DAILY
Qty: 90 TABLET | Refills: 1 | Status: SHIPPED | OUTPATIENT
Start: 2022-12-20

## 2022-12-20 NOTE — PROGRESS NOTES
Family Medicine Follow-Up Progress Note  Patient: Josi Trivedi  1965, 56 y.o., female  Encounter Date: 12/20/2022    ASSESSMENT & PLAN    ICD-10-CM ICD-9-CM    1. Situational stress  F43.9 V62.89       2. Essential hypertension  I10 401.9 lisinopriL (PRINIVIL, ZESTRIL) 30 mg tablet      METABOLIC PANEL, COMPREHENSIVE      LIPID PANEL      THYROID CASCADE PROFILE      METABOLIC PANEL, COMPREHENSIVE      LIPID PANEL      THYROID CASCADE PROFILE      3. Unintended weight gain  F28.4 504.0 METABOLIC PANEL, COMPREHENSIVE      LIPID PANEL      THYROID CASCADE PROFILE      METABOLIC PANEL, COMPREHENSIVE      LIPID PANEL      THYROID CASCADE PROFILE          Orders Placed This Encounter    METABOLIC PANEL, COMPREHENSIVE     Standing Status:   Future     Number of Occurrences:   1     Standing Expiration Date:   12/20/2023    LIPID PANEL     Standing Status:   Future     Number of Occurrences:   1     Standing Expiration Date:   12/20/2023    THYROID CASCADE PROFILE     Standing Status:   Future     Number of Occurrences:   1     Standing Expiration Date:   12/20/2023    lisinopriL (PRINIVIL, ZESTRIL) 30 mg tablet     Sig: Take 1 Tablet by mouth daily. Dispense:  90 Tablet     Refill:  1       Patient Instructions   Mood control adequate with celexa  Bp not quite to goal, may have a stress component also working on healthy habits   Make sure you're getting adequate protein 65-80 g/day is about your goal right now  Goal weight loss about 10 lbs  Recommend no quicker than 1-2 lbs / wk for sustained loss  Increase lisinopril to 30    Rechck about 6 wk     CHIEF COMPLAINT  Chief Complaint   Patient presents with    Blood Pressure Check     Mood check       SUBJECTIVE  Josi Trivedi is a 56 y.o. female presenting today for follow up    HTN: patient reports stable on medications. No chest pain, sob, headache, blurred vision, leg swelling, diaphoresis, falls. Compliant with medications as prescribed.  is checking blood pressures at home and reports range is 130-140+/90+. No significant hyper or hypotensive episodes that the patient reports today. Seeing Dr Nirmala Ba  Is on her metoprolol    Mood: she tells me this is better than before  She tells me she is in control of her emotions moreso  She does not feel flat she tells me, she's just less reactive/more cool/calm    Goal weight for first goal is 179  10 lbs down from today  Drinking more water  Exercising  Adding more whole foods to diet  She already tracks sometimes  She follows a vegan diet    Not constipated/no diarrhea      ROS  Review of Systems  A 12 point review of systems was negative except as noted here or in the HPI. OBJECTIVE  Visit Vitals  BP (!) 139/100   Pulse 84   Temp 97.1 °F (36.2 °C) (Temporal)   Resp 18   Ht 5' 6\" (1.676 m)   Wt 189 lb (85.7 kg)   SpO2 99%   BMI 30.51 kg/m²       Physical Exam  Vitals and nursing note reviewed. Constitutional:       General: She is not in acute distress. Appearance: Normal appearance. She is not ill-appearing, toxic-appearing or diaphoretic. HENT:      Head: Normocephalic and atraumatic. Right Ear: External ear normal.      Left Ear: External ear normal.      Mouth/Throat:      Mouth: Mucous membranes are moist.   Eyes:      General: No scleral icterus. Right eye: No discharge. Left eye: No discharge. Comments: eom grossly intact   Neck:      Comments: No visible neck masses , ROM appears normal from visual inspection  Cardiovascular:      Rate and Rhythm: Normal rate and regular rhythm. Heart sounds: No friction rub. No gallop. Pulmonary:      Effort: Pulmonary effort is normal. No respiratory distress. Breath sounds: No stridor. No wheezing or rhonchi. Musculoskeletal:      Right lower leg: No edema. Left lower leg: No edema. Skin:     General: Skin is warm and dry. Capillary Refill: Capillary refill takes less than 2 seconds. Findings: No rash. Comments: Visible skin is without jaundice, bruising, lesion, pallor, erythema or rash except as otherwise noted   Neurological:      General: No focal deficit present. Mental Status: She is alert and oriented to person, place, and time. Psychiatric:         Mood and Affect: Mood normal.         Behavior: Behavior normal.         Thought Content: Thought content normal.         Judgment: Judgment normal.       No results found for any visits on 22. HISTORICAL  Reviewed and updated today, and as noted below:    Past Medical History:   Diagnosis Date    Anemia 10/31/2011    Anemia NEC     Dr. Bear Hummel     Past Surgical History:   Procedure Laterality Date    ENDOSCOPY, COLON, DIAGNOSTIC  10/07    HX  SECTION      HX TONSILLECTOMY       Family History   Problem Relation Age of Onset    Hypertension Mother     Hypertension Sister     Other Maternal Grandmother         anyeurism     Social History     Tobacco Use   Smoking Status Never   Smokeless Tobacco Never     Social History     Socioeconomic History    Marital status:    Tobacco Use    Smoking status: Never    Smokeless tobacco: Never   Vaping Use    Vaping Use: Never used   Substance and Sexual Activity    Alcohol use: No    Drug use: No     Allergies   Allergen Reactions    Lactose Diarrhea       LAB REVIEW  Lab Results   Component Value Date/Time    Sodium 143 2021 09:48 AM    Potassium 4.6 2021 09:48 AM    Chloride 106 2021 09:48 AM    CO2 29 2021 09:48 AM    Anion gap 2 (L) 2020 12:59 PM    Glucose 94 2021 09:48 AM    BUN 13 2021 09:48 AM    Creatinine 0.81 2021 09:48 AM    BUN/Creatinine ratio 16 2021 09:48 AM    GFR est AA 95 2021 09:48 AM    GFR est non-AA 83 2021 09:48 AM    Calcium 9.5 2021 09:48 AM    Bilirubin, total 0.5 2021 09:48 AM    Alk.  phosphatase 96 2021 09:48 AM    Protein, total 6.9 2021 09:48 AM    Albumin 4.4 2021 09:48 AM    Globulin 3.4 02/24/2020 12:59 PM    A-G Ratio 1.8 07/23/2021 09:48 AM    ALT (SGPT) 16 07/23/2021 09:48 AM     Lab Results   Component Value Date/Time    WBC 2.8 (L) 07/23/2021 09:48 AM    HGB 11.9 07/23/2021 09:48 AM    HCT 35.4 07/23/2021 09:48 AM    PLATELET 354 25/56/8817 09:48 AM    MCV 95 07/23/2021 09:48 AM     No results found for: HBA1C, AZS6YINN, SPL5WTDM, OSO9MHPJ  Lab Results   Component Value Date/Time    Cholesterol, total 169 07/23/2021 09:48 AM    HDL Cholesterol 60 07/23/2021 09:48 AM    LDL, calculated 101 (H) 07/23/2021 09:48 AM    LDL, calculated 90 08/22/2018 11:13 AM    VLDL, calculated 8 07/23/2021 09:48 AM    VLDL, calculated 8 08/22/2018 11:13 AM    Triglyceride 34 07/23/2021 09:48 AM           Rachel Meza MD  [facility]  12/20/22 8:18 AM    Portions of this note may have been populated using smart dictation software and may have \"sounds-like\" errors present. Pt was counseled on risks, benefits and alternatives of treatment options. All questions were asked and answered and the patient was agreeable with the treatment plan as outlined.

## 2022-12-20 NOTE — PROGRESS NOTES
Corina Gifford is a 56 y.o. female    Chief Complaint   Patient presents with    Blood Pressure Check     Mood check       Visit Vitals  BP (!) 139/100   Pulse 84   Temp 97.1 °F (36.2 °C) (Temporal)   Resp 18   Ht 5' 6\" (1.676 m)   Wt 189 lb (85.7 kg)   SpO2 99%   BMI 30.51 kg/m²       3 most recent PHQ Screens 12/20/2022   Little interest or pleasure in doing things Not at all   Feeling down, depressed, irritable, or hopeless Not at all   Total Score PHQ 2 0       No flowsheet data found. No flowsheet data found. 1. Have you been to the ER, urgent care clinic since your last visit? Hospitalized since your last visit? No     2. Have you seen or consulted any other health care providers outside of the [address] [place] [address] since your last visit? Include any pap smears or colon screening.   No

## 2022-12-20 NOTE — PATIENT INSTRUCTIONS
Mood control adequate with celexa  Bp not quite to goal, may have a stress component also working on healthy habits   Make sure you're getting adequate protein 65-80 g/day is about your goal right now  Goal weight loss about 10 lbs  Recommend no quicker than 1-2 lbs / wk for sustained loss  Increase lisinopril to 30    Rechck about 6 wk

## 2023-01-31 ENCOUNTER — OFFICE VISIT (OUTPATIENT)
Dept: FAMILY MEDICINE CLINIC | Age: 58
End: 2023-01-31
Payer: COMMERCIAL

## 2023-01-31 VITALS
TEMPERATURE: 97 F | OXYGEN SATURATION: 100 % | HEART RATE: 68 BPM | SYSTOLIC BLOOD PRESSURE: 147 MMHG | RESPIRATION RATE: 18 BRPM | BODY MASS INDEX: 30.22 KG/M2 | HEIGHT: 66 IN | WEIGHT: 188 LBS | DIASTOLIC BLOOD PRESSURE: 96 MMHG

## 2023-01-31 DIAGNOSIS — I10 ESSENTIAL HYPERTENSION: Primary | ICD-10-CM

## 2023-01-31 DIAGNOSIS — R63.5 UNINTENDED WEIGHT GAIN: ICD-10-CM

## 2023-01-31 DIAGNOSIS — F43.9 SITUATIONAL STRESS: ICD-10-CM

## 2023-01-31 PROCEDURE — 99214 OFFICE O/P EST MOD 30 MIN: CPT | Performed by: FAMILY MEDICINE

## 2023-01-31 PROCEDURE — 3074F SYST BP LT 130 MM HG: CPT | Performed by: FAMILY MEDICINE

## 2023-01-31 PROCEDURE — 3078F DIAST BP <80 MM HG: CPT | Performed by: FAMILY MEDICINE

## 2023-01-31 RX ORDER — LANOLIN ALCOHOL/MO/W.PET/CERES
400 CREAM (GRAM) TOPICAL DAILY
COMMUNITY

## 2023-01-31 RX ORDER — MELATONIN
1000 DAILY
COMMUNITY

## 2023-01-31 NOTE — PROGRESS NOTES
Family Medicine Follow-Up Progress Note  Patient: Lethea Lanes  1965, 56 y.o., female  Encounter Date: 1/31/2023    ASSESSMENT & PLAN    ICD-10-CM ICD-9-CM    1. Essential hypertension  I10 401.9       2. Unintended weight gain  R63.5 783.1       3. Situational stress  F43.9 V62.89           Orders Placed This Encounter    cholecalciferol (Vitamin D3) (1000 Units /25 mcg) tablet     Sig: Take 1,000 Units by mouth daily. magnesium oxide (MAG-OX) 400 mg tablet     Sig: Take 400 mg by mouth daily. Patient Instructions   Bp almost to goal  Recheck next visit--if consistently still running high normal or >130s/80s, consider for decreasing risk factors to increase medication, but first lifestyle intervention and healthy habits advised for a period of time    When able, please gte labs  Reprinted today    6 wk follow up--in office so bp check    CHIEF COMPLAINT  Chief Complaint   Patient presents with    Hypertension     Follow up. B/P 135/98 this morning. Weight Management       SUBJECTIVE  Lethea Lanes is a 56 y.o. female presenting today for follow up. HTN: patient reports stable on medications. No chest pain, sob, headache, blurred vision, leg swelling, diaphoresis, falls. Compliant with medications as prescribed. is checking blood pressures at home and reports range is 130s/80-90. No significant hyper or hypotensive episodes that the patient reports today.  At first she thought she was a little lightheaded, uncomfortable    Weight is fluctuating  Not constipated  Mindful re: eating choices, is trying to eat less    Non weight metrics: wants to not be tired, deal better with stress (doing ok with that for now)  Drive and desire to do more  Wants to operate on the aubree metric  Trying to build habits, doing things regularly/daily is part of her best practice    Still taking celexa, she says that feels good still    ROS  Review of Systems  A 12 point review of systems was negative except as noted here or in the HPI. OBJECTIVE  Visit Vitals  BP (!) 147/96 (BP 1 Location: Left arm, BP Patient Position: Sitting, BP Cuff Size: Adult)   Pulse 68   Temp 97 °F (36.1 °C) (Temporal)   Resp 18   Ht 5' 6\" (1.676 m)   Wt 188 lb (85.3 kg)   SpO2 100%   BMI 30.34 kg/m²       Physical Exam  Vitals and nursing note reviewed. Constitutional:       General: She is not in acute distress. Appearance: Normal appearance. She is not ill-appearing, toxic-appearing or diaphoretic. HENT:      Head: Normocephalic and atraumatic. Right Ear: External ear normal.      Left Ear: External ear normal.      Mouth/Throat:      Mouth: Mucous membranes are moist.   Eyes:      General: No scleral icterus. Right eye: No discharge. Left eye: No discharge. Comments: eom grossly intact   Neck:      Comments: No visible neck masses , ROM appears normal from visual inspection  Cardiovascular:      Rate and Rhythm: Normal rate and regular rhythm. Heart sounds: No friction rub. No gallop. Pulmonary:      Effort: Pulmonary effort is normal. No respiratory distress. Breath sounds: No stridor. No wheezing or rhonchi. Musculoskeletal:      Right lower leg: No edema. Left lower leg: No edema. Skin:     General: Skin is warm and dry. Capillary Refill: Capillary refill takes less than 2 seconds. Findings: No rash. Comments: Visible skin is without jaundice, bruising, lesion, pallor, erythema or rash except as otherwise noted   Neurological:      General: No focal deficit present. Mental Status: She is alert and oriented to person, place, and time. Psychiatric:         Mood and Affect: Mood normal.         Behavior: Behavior normal.         Thought Content: Thought content normal.         Judgment: Judgment normal.       No results found for any visits on 01/31/23.     HISTORICAL  Reviewed and updated today, and as noted below:    Past Medical History:   Diagnosis Date Anemia 10/31/2011    Anemia NEC     Dr. Fran Smith     Past Surgical History:   Procedure Laterality Date    ENDOSCOPY, COLON, DIAGNOSTIC  10/07    HX  SECTION      HX TONSILLECTOMY       Family History   Problem Relation Age of Onset    Hypertension Mother     Hypertension Sister     Other Maternal Grandmother         anyeurism     Social History     Tobacco Use   Smoking Status Never   Smokeless Tobacco Never     Social History     Socioeconomic History    Marital status:    Tobacco Use    Smoking status: Never    Smokeless tobacco: Never   Vaping Use    Vaping Use: Never used   Substance and Sexual Activity    Alcohol use: No    Drug use: No     Allergies   Allergen Reactions    Lactose Diarrhea       LAB REVIEW  Lab Results   Component Value Date/Time    Sodium 143 2021 09:48 AM    Potassium 4.6 2021 09:48 AM    Chloride 106 2021 09:48 AM    CO2 29 2021 09:48 AM    Anion gap 2 (L) 2020 12:59 PM    Glucose 94 2021 09:48 AM    BUN 13 2021 09:48 AM    Creatinine 0.81 2021 09:48 AM    BUN/Creatinine ratio 16 2021 09:48 AM    GFR est AA 95 2021 09:48 AM    GFR est non-AA 83 2021 09:48 AM    Calcium 9.5 2021 09:48 AM    Bilirubin, total 0.5 2021 09:48 AM    Alk.  phosphatase 96 2021 09:48 AM    Protein, total 6.9 2021 09:48 AM    Albumin 4.4 2021 09:48 AM    Globulin 3.4 2020 12:59 PM    A-G Ratio 1.8 2021 09:48 AM    ALT (SGPT) 16 2021 09:48 AM     Lab Results   Component Value Date/Time    WBC 2.8 (L) 2021 09:48 AM    HGB 11.9 2021 09:48 AM    HCT 35.4 2021 09:48 AM    PLATELET 220  09:48 AM    MCV 95 2021 09:48 AM     No results found for: HBA1C, CSN6DLBS, RUI4TSGO, FSP8JMXE  Lab Results   Component Value Date/Time    Cholesterol, total 169 2021 09:48 AM    HDL Cholesterol 60 2021 09:48 AM    LDL, calculated 101 (H) 2021 09:48 AM    LDL, calculated 90 08/22/2018 11:13 AM    VLDL, calculated 8 07/23/2021 09:48 AM    VLDL, calculated 8 08/22/2018 11:13 AM    Triglyceride 34 07/23/2021 09:48 AM           Rachel Philippe MD  [facility]  02/01/23 4:49 PM    Portions of this note may have been populated using smart dictation software and may have \"sounds-like\" errors present. Pt was counseled on risks, benefits and alternatives of treatment options. All questions were asked and answered and the patient was agreeable with the treatment plan as outlined.

## 2023-01-31 NOTE — PROGRESS NOTES
Chief Complaint   Patient presents with    Hypertension     Follow up. B/P 135/98 this morning. Weight Management     1. Have you been to the ER, urgent care clinic since your last visit? Hospitalized since your last visit? No    2. Have you seen or consulted any other health care providers outside of the [address] [place] [address] since your last visit? Include any pap smears or colon screening.  No

## 2023-01-31 NOTE — PATIENT INSTRUCTIONS
Bp almost to goal  Recheck next visit--if consistently still running high normal or >130s/80s, consider for decreasing risk factors to increase medication, but first lifestyle intervention and healthy habits advised for a period of time    When able, please gte labs  Reprinted today    6 wk follow up--in office so bp check

## 2023-03-17 DIAGNOSIS — F43.9 SITUATIONAL STRESS: ICD-10-CM

## 2023-03-17 RX ORDER — CITALOPRAM 10 MG/1
TABLET ORAL
Qty: 90 TABLET | Refills: 1 | Status: SHIPPED | OUTPATIENT
Start: 2023-03-17

## 2023-05-09 RX ORDER — METOPROLOL SUCCINATE 25 MG/1
TABLET, EXTENDED RELEASE ORAL
Qty: 90 TABLET | Refills: 1 | OUTPATIENT
Start: 2023-05-09

## 2023-06-03 DIAGNOSIS — I10 ESSENTIAL (PRIMARY) HYPERTENSION: ICD-10-CM

## 2023-06-05 RX ORDER — LISINOPRIL 30 MG/1
TABLET ORAL
Qty: 90 TABLET | Refills: 1 | Status: SHIPPED | OUTPATIENT
Start: 2023-06-05

## 2023-06-08 LAB
ALBUMIN SERPL-MCNC: 4.3 G/DL (ref 3.8–4.9)
ALBUMIN/GLOB SERPL: 1.9 {RATIO} (ref 1.2–2.2)
ALP SERPL-CCNC: 103 IU/L (ref 44–121)
ALT SERPL-CCNC: 15 IU/L (ref 0–32)
AST SERPL-CCNC: 15 IU/L (ref 0–40)
BILIRUB SERPL-MCNC: 0.5 MG/DL (ref 0–1.2)
BUN SERPL-MCNC: 13 MG/DL (ref 6–24)
BUN/CREAT SERPL: 14 (ref 9–23)
CALCIUM SERPL-MCNC: 9.8 MG/DL (ref 8.7–10.2)
CHLORIDE SERPL-SCNC: 102 MMOL/L (ref 96–106)
CHOLEST SERPL-MCNC: 189 MG/DL (ref 100–199)
CO2 SERPL-SCNC: 27 MMOL/L (ref 20–29)
CREAT SERPL-MCNC: 0.9 MG/DL (ref 0.57–1)
EGFRCR SERPLBLD CKD-EPI 2021: 75 ML/MIN/1.73
GLOBULIN SER CALC-MCNC: 2.3 G/DL (ref 1.5–4.5)
GLUCOSE SERPL-MCNC: 87 MG/DL (ref 70–99)
HDLC SERPL-MCNC: 60 MG/DL
IMP & REVIEW OF LAB RESULTS: NORMAL
LDLC SERPL CALC-MCNC: 118 MG/DL (ref 0–99)
POTASSIUM SERPL-SCNC: 4.6 MMOL/L (ref 3.5–5.2)
PROT SERPL-MCNC: 6.6 G/DL (ref 6–8.5)
SODIUM SERPL-SCNC: 142 MMOL/L (ref 134–144)
TRIGL SERPL-MCNC: 57 MG/DL (ref 0–149)
TSH SERPL DL<=0.005 MIU/L-ACNC: 0.97 UIU/ML (ref 0.45–4.5)
VLDLC SERPL CALC-MCNC: 11 MG/DL (ref 5–40)

## 2023-06-20 ENCOUNTER — OFFICE VISIT (OUTPATIENT)
Facility: CLINIC | Age: 58
End: 2023-06-20
Payer: COMMERCIAL

## 2023-06-20 VITALS
DIASTOLIC BLOOD PRESSURE: 104 MMHG | TEMPERATURE: 97.2 F | WEIGHT: 197 LBS | BODY MASS INDEX: 31.66 KG/M2 | HEIGHT: 66 IN | SYSTOLIC BLOOD PRESSURE: 160 MMHG | OXYGEN SATURATION: 99 % | HEART RATE: 76 BPM | RESPIRATION RATE: 17 BRPM

## 2023-06-20 DIAGNOSIS — Z11.1 SCREENING FOR TUBERCULOSIS: ICD-10-CM

## 2023-06-20 DIAGNOSIS — Z01.419 WELL WOMAN EXAM: Primary | ICD-10-CM

## 2023-06-20 DIAGNOSIS — R00.2 PALPITATIONS: ICD-10-CM

## 2023-06-20 DIAGNOSIS — I10 ESSENTIAL HYPERTENSION: ICD-10-CM

## 2023-06-20 DIAGNOSIS — R63.5 UNINTENDED WEIGHT GAIN: ICD-10-CM

## 2023-06-20 DIAGNOSIS — E66.9 CLASS 1 OBESITY: ICD-10-CM

## 2023-06-20 PROCEDURE — 3080F DIAST BP >= 90 MM HG: CPT | Performed by: FAMILY MEDICINE

## 2023-06-20 PROCEDURE — 99214 OFFICE O/P EST MOD 30 MIN: CPT | Performed by: FAMILY MEDICINE

## 2023-06-20 PROCEDURE — 99396 PREV VISIT EST AGE 40-64: CPT | Performed by: FAMILY MEDICINE

## 2023-06-20 PROCEDURE — 3077F SYST BP >= 140 MM HG: CPT | Performed by: FAMILY MEDICINE

## 2023-06-20 RX ORDER — METOPROLOL SUCCINATE 25 MG/1
25 TABLET, EXTENDED RELEASE ORAL DAILY
Qty: 30 TABLET | Refills: 3 | Status: SHIPPED | OUTPATIENT
Start: 2023-06-20

## 2023-06-20 SDOH — ECONOMIC STABILITY: INCOME INSECURITY: HOW HARD IS IT FOR YOU TO PAY FOR THE VERY BASICS LIKE FOOD, HOUSING, MEDICAL CARE, AND HEATING?: NOT HARD AT ALL

## 2023-06-20 SDOH — ECONOMIC STABILITY: HOUSING INSECURITY
IN THE LAST 12 MONTHS, WAS THERE A TIME WHEN YOU DID NOT HAVE A STEADY PLACE TO SLEEP OR SLEPT IN A SHELTER (INCLUDING NOW)?: NO

## 2023-06-20 SDOH — ECONOMIC STABILITY: FOOD INSECURITY: WITHIN THE PAST 12 MONTHS, YOU WORRIED THAT YOUR FOOD WOULD RUN OUT BEFORE YOU GOT MONEY TO BUY MORE.: NEVER TRUE

## 2023-06-20 SDOH — ECONOMIC STABILITY: FOOD INSECURITY: WITHIN THE PAST 12 MONTHS, THE FOOD YOU BOUGHT JUST DIDN'T LAST AND YOU DIDN'T HAVE MONEY TO GET MORE.: NEVER TRUE

## 2023-06-20 ASSESSMENT — PATIENT HEALTH QUESTIONNAIRE - PHQ9
1. LITTLE INTEREST OR PLEASURE IN DOING THINGS: 0
SUM OF ALL RESPONSES TO PHQ9 QUESTIONS 1 & 2: 0
SUM OF ALL RESPONSES TO PHQ QUESTIONS 1-9: 0
2. FEELING DOWN, DEPRESSED OR HOPELESS: 0
SUM OF ALL RESPONSES TO PHQ QUESTIONS 1-9: 0

## 2023-06-20 NOTE — PROGRESS NOTES
Room 9     Identified pt with two pt identifiers(name and ). Reviewed record in preparation for visit and have obtained necessary documentation. All patient medications has been reviewed. Chief Complaint   Patient presents with    Annual Exam    Medication Refill     Metoprolol            Health Maintenance Due   Topic    Shingles vaccine (1 of 2)    COVID-19 Vaccine (3 - Booster for [employer] series)     Health Maintenance Review: Patient reminded of \"due or due soon\" health maintenance. I have asked the patient to contact his/her primary care provider (PCP) for follow-up on his/her health maintenance. Vitals:    23 1338 23 1351   BP: (!) 158/107 (!) 160/104   Site: Left Upper Arm Right Upper Arm   Position: Sitting Sitting   Cuff Size: Large Adult Large Adult   Pulse: 76    Resp: 17    Temp: 97.2 °F (36.2 °C)    TempSrc: Temporal    SpO2: 99%    Weight: 197 lb (89.4 kg)    Height: 5' 6\" (1.676 m)           1. \"Have you been to the ER, urgent care clinic since your last visit? Hospitalized since your last visit? \" No    2. \"Have you seen or consulted any other health care providers outside of the [address] [place] [address] since your last visit? \" Yes When: 2023 Where: Cardiology Reason for visit: Routine check up. 3. For patients aged 39-70: Has the patient had a colonoscopy / FIT/ Cologuard? Yes - Care Gap present. Most recent result on file      If the patient is female:    4. For patients aged 41-77: Has the patient had a mammogram within the past 2 years? Yes - Care Gap present. Most recent result on file      5. For patients aged 21-65: Has the patient had a pap smear? Yes - Care Gap present. Most recent result on file        Patient is accompanied by self I have received verbal consent from [facility] to discuss any/all medical information while they are present in the room.
09:48 AM    MCV 95 07/23/2021 09:48 AM     No results found for: HBA1C, [id number]  Lab Results   Component Value Date/Time    CHOL 189 06/07/2023 11:04 AM    CHOL 169 07/23/2021 09:48 AM    HDL 60 06/07/2023 11:04 AM    VLDL 8 07/23/2021 09:48 AM           Elsa Pruett MD  [facility]  06/20/23 2:57 PM    Portions of this note may have been populated using smart dictation software and may have \"sounds-like\" errors present. Pt was counseled on risks, benefits and alternatives of treatment options. All questions were asked and answered and the patient was agreeable with the treatment plan as outlined.

## 2023-06-20 NOTE — PATIENT INSTRUCTIONS
Blood pressure is not controlled  Off toprol  Restart  Check 1 mo  Continue lisinopril  Nutrition ref  Goal: protein increase in diet, healthy sources  Goal: fiber increase in diet, healthy sources  Discussed exercise, come up with an accountability/maintenance plan    1 mo follow up with me  Call with concerns

## 2023-09-05 ENCOUNTER — OFFICE VISIT (OUTPATIENT)
Facility: CLINIC | Age: 58
End: 2023-09-05
Payer: COMMERCIAL

## 2023-09-05 VITALS
DIASTOLIC BLOOD PRESSURE: 88 MMHG | OXYGEN SATURATION: 98 % | HEIGHT: 66 IN | SYSTOLIC BLOOD PRESSURE: 142 MMHG | BODY MASS INDEX: 32.3 KG/M2 | TEMPERATURE: 97.4 F | WEIGHT: 201 LBS | HEART RATE: 72 BPM

## 2023-09-05 DIAGNOSIS — T88.7XXA MEDICATION SIDE EFFECT: ICD-10-CM

## 2023-09-05 DIAGNOSIS — I10 ESSENTIAL HYPERTENSION: Primary | ICD-10-CM

## 2023-09-05 DIAGNOSIS — I10 ESSENTIAL HYPERTENSION: ICD-10-CM

## 2023-09-05 PROCEDURE — 3077F SYST BP >= 140 MM HG: CPT | Performed by: FAMILY MEDICINE

## 2023-09-05 PROCEDURE — 3079F DIAST BP 80-89 MM HG: CPT | Performed by: FAMILY MEDICINE

## 2023-09-05 PROCEDURE — 99214 OFFICE O/P EST MOD 30 MIN: CPT | Performed by: FAMILY MEDICINE

## 2023-09-05 RX ORDER — LOSARTAN POTASSIUM 100 MG/1
100 TABLET ORAL DAILY
Qty: 90 TABLET | Refills: 1 | Status: SHIPPED | OUTPATIENT
Start: 2023-09-05

## 2023-09-05 ASSESSMENT — PATIENT HEALTH QUESTIONNAIRE - PHQ9
SUM OF ALL RESPONSES TO PHQ QUESTIONS 1-9: 0
SUM OF ALL RESPONSES TO PHQ9 QUESTIONS 1 & 2: 0
1. LITTLE INTEREST OR PLEASURE IN DOING THINGS: 0
2. FEELING DOWN, DEPRESSED OR HOPELESS: 0
SUM OF ALL RESPONSES TO PHQ QUESTIONS 1-9: 0

## 2023-09-05 NOTE — PATIENT INSTRUCTIONS
Bp not quite to goal  Increase from lisinopril 30 to losartan 100 and recheck in about 6 wk  Continue metoprolol    Discussed accountability tracking--patient is going to earn her way on a trip by paying herself for her successess

## 2023-09-05 NOTE — PROGRESS NOTES
Family Medicine Follow-Up Progress Note  Patient: Bebeto Ozuna  1965, 66 y.o., female  Encounter Date: 9/5/2023     ASSESSMENT & PLAN    ICD-10-CM    1. Essential hypertension  I10 losartan (COZAAR) 100 MG tablet     Basic Metabolic Panel      2. Medication side effect  T88. 7XXA           Orders Placed This Encounter   Procedures    Basic Metabolic Panel     Standing Status:   Future     Standing Expiration Date:   9/5/2024       Patient Instructions   Bp not quite to goal  Increase from lisinopril 30 to losartan 100 and recheck in about 6 wk  Continue metoprolol    Discussed accountability tracking--patient is going to earn her way on a trip by paying herself for her [place]  Chief Complaint   Patient presents with    Follow-up     4 week    Hypertension       SUBJECTIVE  Bebeto Ozuna is a 66 y.o. female presenting today for follow up    She says she recently was sick, she is feeling better now, she tells me it felt like covid but she tested negative for covid    Wt Readings from Last 3 Encounters:   09/05/23 201 lb (91.2 kg)   06/20/23 197 lb (89.4 kg)   01/31/23 188 lb (85.3 kg)     HTN: patient reports stable on medications. No chest pain, sob, headache, blurred vision, leg swelling, diaphoresis, falls. is  compliant with medications as prescribed. is checking blood pressures at home and reports range is not adequately controlled/. No significant hyper or hypotensive episodes that the patient reports today.     Was referred to nutrition, thinks there were some good tips there and she hopes to move forward with that, trying to eat mindfully and intentionally  Used to love exercise, she loved the challenge of it, had previously changed her diet and been vegan  She is still vegan but not exercising  Her goal is 30 minutes in the morning  The did motivate herself to get up, she got to the TV in her exercise room, she had to reset the whole tv, got derailed  She got 11

## 2023-09-18 DIAGNOSIS — F43.9 REACTION TO SEVERE STRESS, UNSPECIFIED: ICD-10-CM

## 2023-09-18 RX ORDER — CITALOPRAM HYDROBROMIDE 10 MG/1
10 TABLET ORAL DAILY
Qty: 90 TABLET | Refills: 1 | Status: SHIPPED | OUTPATIENT
Start: 2023-09-18

## 2023-09-19 RX ORDER — METOPROLOL SUCCINATE 25 MG/1
25 TABLET, EXTENDED RELEASE ORAL DAILY
Qty: 90 TABLET | Refills: 1 | Status: SHIPPED | OUTPATIENT
Start: 2023-09-19

## 2023-10-19 ENCOUNTER — OFFICE VISIT (OUTPATIENT)
Facility: CLINIC | Age: 58
End: 2023-10-19
Payer: COMMERCIAL

## 2023-10-19 VITALS
RESPIRATION RATE: 18 BRPM | TEMPERATURE: 97.3 F | HEIGHT: 66 IN | HEART RATE: 84 BPM | BODY MASS INDEX: 31.42 KG/M2 | OXYGEN SATURATION: 97 % | WEIGHT: 195.5 LBS | DIASTOLIC BLOOD PRESSURE: 84 MMHG | SYSTOLIC BLOOD PRESSURE: 134 MMHG

## 2023-10-19 DIAGNOSIS — E66.9 CLASS 1 OBESITY: ICD-10-CM

## 2023-10-19 DIAGNOSIS — I10 ESSENTIAL HYPERTENSION: Primary | ICD-10-CM

## 2023-10-19 PROCEDURE — 3079F DIAST BP 80-89 MM HG: CPT | Performed by: FAMILY MEDICINE

## 2023-10-19 PROCEDURE — 3075F SYST BP GE 130 - 139MM HG: CPT | Performed by: FAMILY MEDICINE

## 2023-10-19 PROCEDURE — 99213 OFFICE O/P EST LOW 20 MIN: CPT | Performed by: FAMILY MEDICINE

## 2023-10-19 SDOH — ECONOMIC STABILITY: INCOME INSECURITY: HOW HARD IS IT FOR YOU TO PAY FOR THE VERY BASICS LIKE FOOD, HOUSING, MEDICAL CARE, AND HEATING?: NOT HARD AT ALL

## 2023-10-19 SDOH — ECONOMIC STABILITY: FOOD INSECURITY: WITHIN THE PAST 12 MONTHS, YOU WORRIED THAT YOUR FOOD WOULD RUN OUT BEFORE YOU GOT MONEY TO BUY MORE.: NEVER TRUE

## 2023-10-19 SDOH — ECONOMIC STABILITY: FOOD INSECURITY: WITHIN THE PAST 12 MONTHS, THE FOOD YOU BOUGHT JUST DIDN'T LAST AND YOU DIDN'T HAVE MONEY TO GET MORE.: NEVER TRUE

## 2023-10-19 ASSESSMENT — PATIENT HEALTH QUESTIONNAIRE - PHQ9
SUM OF ALL RESPONSES TO PHQ QUESTIONS 1-9: 2
2. FEELING DOWN, DEPRESSED OR HOPELESS: 2
1. LITTLE INTEREST OR PLEASURE IN DOING THINGS: 0
SUM OF ALL RESPONSES TO PHQ9 QUESTIONS 1 & 2: 2

## 2023-10-19 NOTE — PROGRESS NOTES
Chief Complaint   Patient presents with    Hypertension     Follow up. Follow-up     Patient's father passed away recently and she feels that she hasn't had time to fully grieve because she had to take care of others during this time. 1. Have you been to the ER, urgent care clinic since your last visit? Hospitalized since your last visit? No    2. Have you seen or consulted any other health care providers outside of the [address] [place] [address] since your last visit? Include any pap smears or colon screening.  No

## 2023-10-19 NOTE — PROGRESS NOTES
Family Medicine Follow-Up Progress Note  Patient: Randy Blocker  1965, 66 y.o., female  Encounter Date: 10/19/2023     ASSESSMENT & PLAN    ICD-10-CM    1. Essential hypertension  I10       2. Class 1 obesity  E66.9           No orders of the defined types were placed in this encounter. Patient Instructions   Has fu appt in June  Sooner if you need me  Bp is at goal  Weight is coming down       CHIEF COMPLAINT  Chief Complaint   Patient presents with    Hypertension     Follow up. Follow-up     Patient's father passed away recently and she feels that she hasn't had time to fully grieve because she had to take care of others during this time. Candida Ballard is a 66 y.o. female presenting today for follow up    HTN: patient reports stable on medications. No chest pain, sob, headache, blurred vision, leg swelling, diaphoresis, falls. is  compliant with medications as prescribed. is checking blood pressures at home and reports range is mostly [id number]/80s. No significant hyper or hypotensive episodes that the patient reports today.   *hands have not been swollen, which is a sign for her that her bp has been pretty well controlled    Has been doing calming music and meditation  Last time talked about exercise at our ivist, she is still doing that, she tells me that it is    Last visit we increased from lisinopril 30 to losartan 100 and that has been doing a good job  Metoprolol is still on board    She is using tracking / support for accountability for weight mgmt    Wt Readings from Last 3 Encounters:   10/19/23 88.7 kg (195 lb 8 oz)   09/05/23 91.2 kg (201 lb)   06/20/23 89.4 kg (197 lb)     Just started a trial of Noom, she says she finds it to be supportive/enjoyable  Going to the nutritionist    Feels that she has been not having as much opportunity to grieve the loss of her father  And her mom came down to help, but her mom did come down with covid and her adult daughter

## 2024-01-24 ENCOUNTER — HOSPITAL ENCOUNTER (OUTPATIENT)
Facility: HOSPITAL | Age: 59
Setting detail: RECURRING SERIES
Discharge: HOME | End: 2024-01-27

## 2024-01-24 NOTE — PROGRESS NOTES
[place] [employer]. Pt is doing well with consistency and using netFactor appt to stay accountable with logging food choices daily.     Previous Goals:   - Focus on water intake and getting 40 oz in morning and 40 oz in afternoon   - Get in morning exercise and practice a mindfulness activity in the evening each day   - Meal prep and having enough to stay ready for meals      Nutrition Prescription and  Intervention Educated pt on the basics of healthy weight loss and the rationale for dietary modifications and increased activity. Educated pt on lean proteins, healthy fats, non-starchy vegetables, and complex carbohydrate food sources. Discussed limiting carbohydrates, label reading, meal timing, and appropriate serving sizes. Encouraged pt to avoid sugary beverages. Reviewed meal builder tool, calorie and macro breakdown as well as exercise parameters.      Patient Education:  [x]  Review current plan with patient   []  Other:    Handouts/  Information Provided: []  Carbohydrates  []  Protein  []  Fiber  []  Serving Sizes  []  Fluids  []  General guidelines []  Diabetes  []  Cholesterol  []  Sodium  []  SBGM  []  Food Journals  []  Others:      Patient Goals - Add in resistance and weight training to current exercise routine  - Focus on including more whole food choices  - Continue to use the netFactor mati for accountability.      PLAN  [x]  Continue on current plan []  Follow-up PRN   []  Discharge due to :    [x]  Next appt:  2-4 weeks      Dietitian: Norma Martinez RDN    Date: 1/24/2024 Time: 900AM

## 2024-03-06 DIAGNOSIS — I10 ESSENTIAL HYPERTENSION: ICD-10-CM

## 2024-03-06 RX ORDER — LOSARTAN POTASSIUM 100 MG/1
100 TABLET ORAL DAILY
Qty: 90 TABLET | Refills: 1 | Status: SHIPPED | OUTPATIENT
Start: 2024-03-06

## 2024-03-19 DIAGNOSIS — F43.9 REACTION TO SEVERE STRESS, UNSPECIFIED: ICD-10-CM

## 2024-03-19 RX ORDER — CITALOPRAM HYDROBROMIDE 10 MG/1
10 TABLET ORAL DAILY
Qty: 90 TABLET | Refills: 1 | Status: SHIPPED | OUTPATIENT
Start: 2024-03-19

## 2024-03-19 RX ORDER — METOPROLOL SUCCINATE 25 MG/1
25 TABLET, EXTENDED RELEASE ORAL DAILY
Qty: 90 TABLET | Refills: 1 | Status: SHIPPED | OUTPATIENT
Start: 2024-03-19

## 2024-05-29 ENCOUNTER — TRANSCRIBE ORDERS (OUTPATIENT)
Facility: HOSPITAL | Age: 59
End: 2024-05-29

## 2024-05-29 DIAGNOSIS — Z00.00 PREVENTATIVE HEALTH CARE: Primary | ICD-10-CM

## 2024-06-14 ENCOUNTER — HOSPITAL ENCOUNTER (OUTPATIENT)
Facility: HOSPITAL | Age: 59
Discharge: HOME | End: 2024-06-14
Attending: SPECIALIST

## 2024-06-14 DIAGNOSIS — Z00.00 PREVENTATIVE HEALTH CARE: ICD-10-CM

## 2024-06-14 PROCEDURE — 75571 CT HRT W/O DYE W/CA TEST: CPT
